# Patient Record
Sex: FEMALE | Race: WHITE | Employment: FULL TIME | URBAN - METROPOLITAN AREA
[De-identification: names, ages, dates, MRNs, and addresses within clinical notes are randomized per-mention and may not be internally consistent; named-entity substitution may affect disease eponyms.]

---

## 2018-01-03 ENCOUNTER — HOSPITAL ENCOUNTER (OUTPATIENT)
Dept: MAMMOGRAPHY | Age: 53
Discharge: HOME | End: 2018-01-03
Attending: NURSE PRACTITIONER | Admitting: NURSE PRACTITIONER

## 2018-01-03 DIAGNOSIS — Z12.31 ENCOUNTER FOR SCREENING MAMMOGRAM FOR BREAST CANCER: ICD-10-CM

## 2018-01-23 ENCOUNTER — OFFICE VISIT (OUTPATIENT)
Dept: ORTHOPEDIC SURGERY | Age: 53
End: 2018-01-23

## 2018-01-23 VITALS — WEIGHT: 189.6 LBS | BODY MASS INDEX: 28.41 KG/M2

## 2018-01-23 DIAGNOSIS — M25.521 RIGHT ELBOW PAIN: Primary | ICD-10-CM

## 2018-01-23 DIAGNOSIS — S53.401A ELBOW SPRAIN, RIGHT, INITIAL ENCOUNTER: ICD-10-CM

## 2018-01-23 PROCEDURE — 99203 OFFICE O/P NEW LOW 30 MIN: CPT | Performed by: ORTHOPAEDIC SURGERY

## 2018-01-23 PROCEDURE — E0191 PROTECTOR HEEL OR ELBOW: HCPCS | Performed by: ORTHOPAEDIC SURGERY

## 2018-01-23 NOTE — PROGRESS NOTES
Chief Complaint  Elbow Pain (slipped on ice 1/14/18 landing on hand and elbow. )      History of Present Illness:  Shruthi Tobin is a 47 y.o. female right-hand-dominant, healthy and active who presents for right elbow pain. This began 9 days ago, she fell on an outstretched right hand and wrist after tripping while running. She had immediate pain in the right elbow posteriorly and laterally followed by swelling and stiffness. Symptoms have not fully resolved, therefore she presents today, her 1st evaluation. No clicking or crepitance. She has discomfort with motion and rotation. She has discomfort with attempting full elbow extension. Medical History  Past Medical History:   Diagnosis Date    Skin cancer     Melanoma, Stomach     Past Surgical History:   Procedure Laterality Date    ANTERIOR CRUCIATE LIGAMENT REPAIR  1993    APPENDECTOMY  1975    FINGER SURGERY  2008    Disclocated finger      Family History   Problem Relation Age of Onset    High Blood Pressure Mother     Diabetes Mother     Diabetes Sister      Social History     Social History    Marital status:      Spouse name: Mando Nugent Number of children: 0    Years of education: N/A     Occupational History    system annalyis  [de-identified]     Social History Main Topics    Smoking status: Never Smoker    Smokeless tobacco: Never Used    Alcohol use Yes      Comment: social    Drug use: No    Sexual activity: Yes     Partners: Male     Other Topics Concern    Not on file     Social History Narrative    No narrative on file     No current outpatient prescriptions on file. No current facility-administered medications for this visit. No Known Allergies    Review of Systems  Relevant review of systems reviewed and available in the patient's chart    Vital Signs  There were no vitals filed for this visit. General Exam:   Constitutional: Patient is adequately groomed with no evidence of malnutrition  Mental Status:  The patient Reason for exam:     Answer:   Elbow Pain       Treatment Plan:  Recommendation made for nonsurgical measures. We will fit and provide a removable elbow brace. We discussed appropriate activity modifications and weight training limitations over the next 5-6 weeks. Follow-up at that point for repeat x-rays of the right elbow. I anticipate good outcome long-term. She will be working on range of motion and stretching at home. Procedures    Elbow Protector B&C (Brace)     Patient was prescribed a Bird [employer] Yuliya Elbow Protector. The right elbow will require protection from this device to improve their function. The orthosis will assist in protecting the affected area, provide functional support and facilitate healing. The patient was educated and fit by a healthcare professional with expert knowledge and specialization in brace application while under the direct supervision of the treating physician. Verbal and written instructions for the use of and application of this item were provided. They were instructed to contact the office immediately should the brace result in increased pain, decreased sensation, increased swelling or worsening of the condition. All questions and concerns were addressed today. Patient is in agreement with the plan.         Lee Aguilar MD  Hand & Upper Extremity Surgery  [address] [facility] partner of [facility] ([facility])

## 2018-02-27 ENCOUNTER — OFFICE VISIT (OUTPATIENT)
Dept: ORTHOPEDIC SURGERY | Age: 53
End: 2018-02-27

## 2018-02-27 VITALS — BODY MASS INDEX: 28.08 KG/M2 | HEIGHT: 69 IN | WEIGHT: 189.6 LBS

## 2018-02-27 DIAGNOSIS — M25.521 ELBOW PAIN, RIGHT: Primary | ICD-10-CM

## 2018-02-27 DIAGNOSIS — S53.401A ELBOW SPRAIN, RIGHT, INITIAL ENCOUNTER: ICD-10-CM

## 2018-02-27 PROCEDURE — 99213 OFFICE O/P EST LOW 20 MIN: CPT | Performed by: ORTHOPAEDIC SURGERY

## 2018-02-27 NOTE — PROGRESS NOTES
Chief Complaint   Patient presents with    Follow-up     Right elbow sprain, possible radial head fracture DOI 1/14/2018       HISTORY OF PRESENT ILLNESS:  Nevin Tsang is a 47 y.o.  patient here for repeat x-ray evaluation after sustaining a Right elbow sprain versus nondisplaced fracture, now 6 weeks ago. Treated nonoperatively. Overall her symptoms are much improved. She has some discomfort in the elbow, minimal stiffness, otherwise she feels greatly improved. ROS:  ROS neg     Past medical history is reviewed again today, no changes to report    PHYSICAL EXAMINATION:  Patient is alert and pleasant, in no acute distress. The affected extremity is examined today. Right elbow reveals resolution of swelling. Minimal discomfort laterally over the radiocapitellar area. She has extension to 0, with full flexion. She has slight hyperextension on the opposite side. Full forearm rotation right side with full motion of the right hand and right wrist.  No varus valgus laxity. No popping or instability the elbow    X-rays:  3 views, right elbow, impression: Excellent alignment of the joint, no apparent foreign body or loose body. Questionable healed radial neck fracture. IMPRESSION AND PLAN: Right elbow sprain versus nondisplaced fracture, much improved  Doing well after sustaining a right elbow injury. X-rays reveal maintained alignment, patient is doing well clinically also. We will continue with our current care plan. Progressive activities as tolerated. Range of motion and stretching. Follow-up in 6 weeks unless needed sooner. If symptoms have resolved, she will follow-up as necessary. If symptoms persist, MRI would be warranted. She is pleased with her progress overall    All questions and concerns were addressed today. Patient is in agreement with the plan.         Ang Pollock MD  Hand & Upper Extremity Surgery  [address]  A partner of [facility]

## 2018-08-08 ENCOUNTER — HOSPITAL ENCOUNTER (OUTPATIENT)
Age: 53
Discharge: HOME | End: 2018-08-08
Payer: COMMERCIAL

## 2018-08-08 ENCOUNTER — OFFICE VISIT (OUTPATIENT)
Dept: INTERNAL MEDICINE CLINIC | Age: 53
End: 2018-08-08

## 2018-08-08 VITALS
SYSTOLIC BLOOD PRESSURE: 122 MMHG | BODY MASS INDEX: 28.58 KG/M2 | DIASTOLIC BLOOD PRESSURE: 68 MMHG | HEIGHT: 69 IN | HEART RATE: 80 BPM | WEIGHT: 193 LBS | OXYGEN SATURATION: 99 %

## 2018-08-08 DIAGNOSIS — D50.9 IRON DEFICIENCY ANEMIA, UNSPECIFIED IRON DEFICIENCY ANEMIA TYPE: ICD-10-CM

## 2018-08-08 DIAGNOSIS — C43.9 MALIGNANT MELANOMA, UNSPECIFIED SITE (HCC): ICD-10-CM

## 2018-08-08 DIAGNOSIS — E78.2 MIXED HYPERLIPIDEMIA: ICD-10-CM

## 2018-08-08 DIAGNOSIS — Z00.00 ROUTINE GENERAL MEDICAL EXAMINATION AT A HEALTH CARE FACILITY: Primary | ICD-10-CM

## 2018-08-08 DIAGNOSIS — Z12.11 SCREENING FOR COLON CANCER: ICD-10-CM

## 2018-08-08 DIAGNOSIS — Z23 NEED FOR PROPHYLACTIC VACCINATION AND INOCULATION AGAINST VARICELLA: ICD-10-CM

## 2018-08-08 DIAGNOSIS — Z00.00 ROUTINE GENERAL MEDICAL EXAMINATION AT A HEALTH CARE FACILITY: ICD-10-CM

## 2018-08-08 LAB
A/G RATIO: 1.6 (ref 1.1–2.2)
ALBUMIN SERPL-MCNC: 4.5 G/DL (ref 3.4–5)
ALP BLD-CCNC: 42 U/L (ref 40–129)
ALT SERPL-CCNC: 11 U/L (ref 10–40)
ANION GAP SERPL CALCULATED.3IONS-SCNC: 11 MMOL/L (ref 3–16)
AST SERPL-CCNC: 14 U/L (ref 15–37)
BASOPHILS ABSOLUTE: 0 K/UL (ref 0–0.2)
BASOPHILS RELATIVE PERCENT: 0.9 %
BILIRUB SERPL-MCNC: 0.4 MG/DL (ref 0–1)
BUN BLDV-MCNC: 17 MG/DL (ref 7–20)
CALCIUM SERPL-MCNC: 9.6 MG/DL (ref 8.3–10.6)
CHLORIDE BLD-SCNC: 104 MMOL/L (ref 99–110)
CHOLESTEROL, TOTAL: 272 MG/DL (ref 0–199)
CO2: 27 MMOL/L (ref 21–32)
CREAT SERPL-MCNC: 0.8 MG/DL (ref 0.6–1.1)
EOSINOPHILS ABSOLUTE: 0.1 K/UL (ref 0–0.6)
EOSINOPHILS RELATIVE PERCENT: 2.2 %
GFR AFRICAN AMERICAN: >60
GFR NON-AFRICAN AMERICAN: >60
GLOBULIN: 2.8 G/DL
GLUCOSE BLD-MCNC: 100 MG/DL (ref 70–99)
HCT VFR BLD CALC: 38.4 % (ref 36–48)
HDLC SERPL-MCNC: 63 MG/DL (ref 40–60)
HEMOGLOBIN: 12.8 G/DL (ref 12–16)
LDL CHOLESTEROL CALCULATED: 197 MG/DL
LYMPHOCYTES ABSOLUTE: 1.7 K/UL (ref 1–5.1)
LYMPHOCYTES RELATIVE PERCENT: 35 %
MCH RBC QN AUTO: 31 PG (ref 26–34)
MCHC RBC AUTO-ENTMCNC: 33.4 G/DL (ref 31–36)
MCV RBC AUTO: 92.9 FL (ref 80–100)
MONOCYTES ABSOLUTE: 0.4 K/UL (ref 0–1.3)
MONOCYTES RELATIVE PERCENT: 8.4 %
NEUTROPHILS ABSOLUTE: 2.6 K/UL (ref 1.7–7.7)
NEUTROPHILS RELATIVE PERCENT: 53.5 %
PDW BLD-RTO: 13.5 % (ref 12.4–15.4)
PLATELET # BLD: 293 K/UL (ref 135–450)
PMV BLD AUTO: 7.7 FL (ref 5–10.5)
POTASSIUM SERPL-SCNC: 4.8 MMOL/L (ref 3.5–5.1)
RBC # BLD: 4.13 M/UL (ref 4–5.2)
SODIUM BLD-SCNC: 142 MMOL/L (ref 136–145)
TOTAL PROTEIN: 7.3 G/DL (ref 6.4–8.2)
TRIGL SERPL-MCNC: 60 MG/DL (ref 0–150)
TSH REFLEX: 1.64 UIU/ML (ref 0.27–4.2)
VLDLC SERPL CALC-MCNC: 12 MG/DL
WBC # BLD: 4.9 K/UL (ref 4–11)

## 2018-08-08 PROCEDURE — 84443 ASSAY THYROID STIM HORMONE: CPT

## 2018-08-08 PROCEDURE — 36415 COLL VENOUS BLD VENIPUNCTURE: CPT

## 2018-08-08 PROCEDURE — 80061 LIPID PANEL: CPT

## 2018-08-08 PROCEDURE — 90471 IMMUNIZATION ADMIN: CPT | Performed by: NURSE PRACTITIONER

## 2018-08-08 PROCEDURE — 85025 COMPLETE CBC W/AUTO DIFF WBC: CPT

## 2018-08-08 PROCEDURE — 90750 HZV VACC RECOMBINANT IM: CPT | Performed by: NURSE PRACTITIONER

## 2018-08-08 PROCEDURE — 99396 PREV VISIT EST AGE 40-64: CPT | Performed by: NURSE PRACTITIONER

## 2018-08-08 PROCEDURE — 80053 COMPREHEN METABOLIC PANEL: CPT

## 2018-08-08 ASSESSMENT — ENCOUNTER SYMPTOMS
DIARRHEA: 0
SINUS PRESSURE: 0
VOMITING: 0
SORE THROAT: 0
FACIAL SWELLING: 0
COUGH: 0
NAUSEA: 0

## 2018-08-08 ASSESSMENT — PATIENT HEALTH QUESTIONNAIRE - PHQ9
1. LITTLE INTEREST OR PLEASURE IN DOING THINGS: 1
SUM OF ALL RESPONSES TO PHQ QUESTIONS 1-9: 2
2. FEELING DOWN, DEPRESSED OR HOPELESS: 1
SUM OF ALL RESPONSES TO PHQ9 QUESTIONS 1 & 2: 2
SUM OF ALL RESPONSES TO PHQ QUESTIONS 1-9: 2

## 2018-08-08 NOTE — PROGRESS NOTES
Neurological: Negative for dizziness, weakness and headaches. Hematological: Negative for adenopathy. Psychiatric/Behavioral: Positive for dysphoric mood (mother passed away 5/18). Negative for sleep disturbance and suicidal ideas. Objective:   Physical Exam   Constitutional: She is oriented to person, place, and time. She appears well-developed and well-nourished. No distress. HENT:   Head: Normocephalic and atraumatic. Right Ear: External ear normal.   Left Ear: External ear normal.   Nose: Nose normal.   Mouth/Throat: Oropharynx is clear and moist.   Eyes: Conjunctivae and EOM are normal. Pupils are equal, round, and reactive to light. Neck: Normal range of motion. Neck supple. Cardiovascular: Normal rate, regular rhythm, normal heart sounds and intact distal pulses. Pulmonary/Chest: Effort normal and breath sounds normal. She has no wheezes. She has no rales. Abdominal: Soft. Bowel sounds are normal. She exhibits no mass. There is no tenderness. There is no rebound and no guarding. Musculoskeletal: She exhibits no edema. Lymphadenopathy:     She has no cervical adenopathy. Neurological: She is alert and oriented to person, place, and time. No cranial nerve deficit. Skin: She is not diaphoretic. Psychiatric: She has a normal mood and affect. Her behavior is normal. Thought content normal.   Tearful today talking about mother's death. Good eye contact. Casually dressed. Assessment:      1. Routine general medical examination at a health care facility  Continue healthy lifestyle choices. Enc exercise regularly and good dietary intake. Recommend annual eye exams; biannual dental exams.    - Comprehensive Metabolic Panel; Future  - Lipid Panel; Future  - CBC Auto Differential; Future  - TSH with Reflex; Future    2. Iron deficiency anemia, unspecified iron deficiency anemia type  Monitor, check CBC    3. Mixed hyperlipidemia  Monitor, check LIPID    4.  Malignant melanoma, unspecified site [facility])  Monitor, f/u DERM annually    5. Screening for colon cancer  Given in office  - POCT FECAL IMMUNOCHEMICAL TEST (FIT); Future    6. Need for prophylactic vaccination and inoculation against varicella  Pt tolerated well. RTO 2 months for 2nd dose  - In- - [facility])          Plan:      See above plan    Return in about 1 year (around 8/8/2019) for Physical, shingrix 2-3 months ([place] reminder call).           Hammad Bear, APRUCHE - CNP

## 2018-08-14 DIAGNOSIS — Z12.11 SCREENING FOR COLON CANCER: ICD-10-CM

## 2018-08-14 LAB
CONTROL: NORMAL
HEMOCCULT STL QL: NEGATIVE

## 2018-08-14 PROCEDURE — 82274 ASSAY TEST FOR BLOOD FECAL: CPT | Performed by: NURSE PRACTITIONER

## 2018-08-24 ENCOUNTER — TELEPHONE (OUTPATIENT)
Dept: INTERNAL MEDICINE CLINIC | Age: 53
End: 2018-08-24

## 2018-08-24 RX ORDER — ATORVASTATIN CALCIUM 10 MG/1
10 TABLET, FILM COATED ORAL DAILY
Qty: 90 TABLET | Refills: 1 | Status: SHIPPED | OUTPATIENT
Start: 2018-08-24 | End: 2018-09-14 | Stop reason: SDUPTHER

## 2018-08-24 NOTE — TELEPHONE ENCOUNTER
Sent rx to pharmacy. Recheck labs in 6 months with OV to make sure she is doing ok on new medication.

## 2018-09-14 RX ORDER — ATORVASTATIN CALCIUM 10 MG/1
10 TABLET, FILM COATED ORAL DAILY
Qty: 90 TABLET | Refills: 0 | Status: SHIPPED | OUTPATIENT
Start: 2018-09-14 | End: 2019-01-15 | Stop reason: SDUPTHER

## 2018-09-14 NOTE — TELEPHONE ENCOUNTER
Refill request for  Atorvastatin  medication.      Name of Kansnoemirinne 44     Last visit - 08/18/18     Pending visit - none    Last refill -08/24/18 at [facility]

## 2018-11-05 RX ORDER — ATORVASTATIN CALCIUM 10 MG/1
10 TABLET, FILM COATED ORAL DAILY
Qty: 90 TABLET | OUTPATIENT
Start: 2018-11-05

## 2018-11-16 ENCOUNTER — NURSE ONLY (OUTPATIENT)
Dept: INTERNAL MEDICINE CLINIC | Age: 53
End: 2018-11-16
Payer: COMMERCIAL

## 2018-11-16 DIAGNOSIS — Z23 NEED FOR ZOSTER VACCINATION: Primary | ICD-10-CM

## 2018-11-16 DIAGNOSIS — Z23 NEED FOR INFLUENZA VACCINATION: ICD-10-CM

## 2018-11-16 PROCEDURE — 90471 IMMUNIZATION ADMIN: CPT | Performed by: NURSE PRACTITIONER

## 2018-11-16 PROCEDURE — 90686 IIV4 VACC NO PRSV 0.5 ML IM: CPT | Performed by: NURSE PRACTITIONER

## 2018-12-10 ENCOUNTER — PATIENT MESSAGE (OUTPATIENT)
Dept: INTERNAL MEDICINE CLINIC | Age: 53
End: 2018-12-10

## 2018-12-10 NOTE — TELEPHONE ENCOUNTER
From: Gina Face  To: Yves SandovalSHWETA - CNP  Sent: 12/10/2018 12:21 PM EST  Subject: Non-Urgent Medical Question    Hello - I'm trying to determine what to do about the \"ringing\" in my left ear that seemed to start this past August. I barely notice it when there is \"white noise\" but at night, trying to sleep it is very noticeable. Please advise.     Thanks,  Kody Lam

## 2018-12-28 ENCOUNTER — OFFICE VISIT (OUTPATIENT)
Dept: INTERNAL MEDICINE CLINIC | Age: 53
End: 2018-12-28
Payer: COMMERCIAL

## 2018-12-28 VITALS
DIASTOLIC BLOOD PRESSURE: 64 MMHG | HEART RATE: 83 BPM | BODY MASS INDEX: 29.6 KG/M2 | SYSTOLIC BLOOD PRESSURE: 124 MMHG | OXYGEN SATURATION: 98 % | WEIGHT: 199 LBS

## 2018-12-28 DIAGNOSIS — H93.12 TINNITUS OF LEFT EAR: Primary | ICD-10-CM

## 2018-12-28 PROCEDURE — 99213 OFFICE O/P EST LOW 20 MIN: CPT | Performed by: NURSE PRACTITIONER

## 2018-12-28 ASSESSMENT — ENCOUNTER SYMPTOMS
FACIAL SWELLING: 0
VOMITING: 0
SINUS PRESSURE: 0
DIARRHEA: 0
NAUSEA: 0
COUGH: 0
SORE THROAT: 0

## 2019-01-05 ENCOUNTER — HOSPITAL ENCOUNTER (OUTPATIENT)
Dept: WOMENS IMAGING | Age: 54
Discharge: HOME | End: 2019-01-05
Payer: COMMERCIAL

## 2019-01-05 DIAGNOSIS — Z12.31 ENCOUNTER FOR SCREENING MAMMOGRAM FOR BREAST CANCER: ICD-10-CM

## 2019-01-05 PROCEDURE — 77067 SCR MAMMO BI INCL CAD: CPT

## 2019-01-15 RX ORDER — ATORVASTATIN CALCIUM 10 MG/1
10 TABLET, FILM COATED ORAL DAILY
Qty: 90 TABLET | Refills: 0 | Status: SHIPPED | OUTPATIENT
Start: 2019-01-15 | End: 2019-05-03 | Stop reason: SDUPTHER

## 2019-05-07 RX ORDER — ATORVASTATIN CALCIUM 10 MG/1
10 TABLET, FILM COATED ORAL DAILY
Qty: 90 TABLET | Refills: 0 | Status: SHIPPED | OUTPATIENT
Start: 2019-05-07 | End: 2019-08-09 | Stop reason: SDUPTHER

## 2019-05-07 NOTE — TELEPHONE ENCOUNTER
Refill request for lipitor medication.      Name of Pharmacy- [facility]    Last visit - 12/28/18     Pending visit - None    Last refill -5/3/19  0 refills

## 2019-08-10 RX ORDER — ATORVASTATIN CALCIUM 10 MG/1
10 TABLET, FILM COATED ORAL DAILY
Qty: 90 TABLET | Refills: 0 | Status: SHIPPED | OUTPATIENT
Start: 2019-08-10 | End: 2020-03-27 | Stop reason: SDUPTHER

## 2019-11-15 ENCOUNTER — NURSE ONLY (OUTPATIENT)
Dept: INTERNAL MEDICINE CLINIC | Age: 54
End: 2019-11-15
Payer: COMMERCIAL

## 2019-11-15 DIAGNOSIS — Z23 NEED FOR IMMUNIZATION AGAINST INFLUENZA: Primary | ICD-10-CM

## 2019-11-15 PROCEDURE — 90471 IMMUNIZATION ADMIN: CPT | Performed by: NURSE PRACTITIONER

## 2019-11-15 PROCEDURE — 90686 IIV4 VACC NO PRSV 0.5 ML IM: CPT | Performed by: NURSE PRACTITIONER

## 2020-01-07 ENCOUNTER — HOSPITAL ENCOUNTER (OUTPATIENT)
Dept: WOMENS IMAGING | Age: 55
Discharge: HOME | End: 2020-01-07
Payer: COMMERCIAL

## 2020-01-07 PROCEDURE — 77067 SCR MAMMO BI INCL CAD: CPT

## 2020-01-15 ENCOUNTER — HOSPITAL ENCOUNTER (OUTPATIENT)
Dept: WOMENS IMAGING | Age: 55
End: 2020-01-15
Payer: COMMERCIAL

## 2020-01-15 ENCOUNTER — HOSPITAL ENCOUNTER (OUTPATIENT)
Dept: WOMENS IMAGING | Age: 55
Discharge: HOME | End: 2020-01-15
Payer: COMMERCIAL

## 2020-01-15 PROCEDURE — 77065 DX MAMMO INCL CAD UNI: CPT

## 2020-04-01 ENCOUNTER — TELEMEDICINE (OUTPATIENT)
Dept: INTERNAL MEDICINE CLINIC | Age: 55
End: 2020-04-01
Payer: COMMERCIAL

## 2020-04-01 PROCEDURE — 99396 PREV VISIT EST AGE 40-64: CPT | Performed by: NURSE PRACTITIONER

## 2020-04-01 ASSESSMENT — PATIENT HEALTH QUESTIONNAIRE - PHQ9
1. LITTLE INTEREST OR PLEASURE IN DOING THINGS: 0
SUM OF ALL RESPONSES TO PHQ9 QUESTIONS 1 & 2: 0
SUM OF ALL RESPONSES TO PHQ QUESTIONS 1-9: 0
2. FEELING DOWN, DEPRESSED OR HOPELESS: 0
SUM OF ALL RESPONSES TO PHQ QUESTIONS 1-9: 0

## 2020-04-01 ASSESSMENT — ENCOUNTER SYMPTOMS
SORE THROAT: 0
SINUS PRESSURE: 0
NAUSEA: 0
COUGH: 0
FACIAL SWELLING: 0
DIARRHEA: 0
VOMITING: 0

## 2020-04-01 NOTE — PROGRESS NOTES
2020    TELEHEALTH EVALUATION -- Audio/Visual (During HWZVA-94 public health emergency)    HPI:    Ernie Guy (:  1965) has requested an audio/video evaluation for the following concern(s): routine physical exam.      She is due for Cologuard, She completed a FIT stool screening one year ago which came back normal. She had a routine PAP in January at [facility]. A recent dental exam, and an eye exam in 2020. She has been exercising by walking outside. She was furlowed today related to COVID-19 but does not show concern about this at this time. Melanoma hx- She follows up with dermatology in . Denies changes to her skin. HLD- lipid screening for hyperlipidemia. She is taking Lipitor with no concerns or issues. Review of Systems   Constitutional: Negative for appetite change, chills, fatigue, fever and unexpected weight change. HENT: Negative for congestion, ear discharge, ear pain, facial swelling, hearing loss, sinus pressure, sneezing and sore throat. Respiratory: Negative for cough. Cardiovascular: Negative for chest pain. Gastrointestinal: Negative for diarrhea, nausea and vomiting. Genitourinary: Negative for difficulty urinating, dysuria, hematuria and urgency. Musculoskeletal: Negative for arthralgias and gait problem. Neurological: Negative for dizziness, weakness and headaches. Hematological: Negative for adenopathy. Psychiatric/Behavioral: Negative for sleep disturbance and suicidal ideas. Prior to Visit Medications    Medication Sig Taking?  Authorizing Provider   atorvastatin (LIPITOR) 10 MG tablet Take 1 tablet by mouth daily Yes Katherine Peter, APRN - CNP       Social History     Tobacco Use    Smoking status: Never Smoker    Smokeless tobacco: Never Used   Substance Use Topics    Alcohol use: Yes     Comment: social    Drug use: No        Past Medical History:   Diagnosis Date    Skin cancer     Melanoma, Stomach   ,   Social

## 2020-04-20 ENCOUNTER — TELEPHONE (OUTPATIENT)
Dept: ORTHOPEDIC SURGERY | Age: 55
End: 2020-04-20

## 2020-04-20 ENCOUNTER — OFFICE VISIT (OUTPATIENT)
Dept: ORTHOPEDIC SURGERY | Age: 55
End: 2020-04-20
Payer: COMMERCIAL

## 2020-04-20 VITALS — HEIGHT: 69 IN | WEIGHT: 199.08 LBS | BODY MASS INDEX: 29.49 KG/M2

## 2020-04-20 PROBLEM — S52.121A CLOSED DISPLACED FRACTURE OF HEAD OF RIGHT RADIUS: Status: ACTIVE | Noted: 2020-04-20

## 2020-04-20 PROCEDURE — 99213 OFFICE O/P EST LOW 20 MIN: CPT | Performed by: ORTHOPAEDIC SURGERY

## 2020-04-20 PROCEDURE — L3760 EO ADJ JT PREFAB CUSTOM FIT: HCPCS | Performed by: ORTHOPAEDIC SURGERY

## 2020-04-20 NOTE — PROGRESS NOTES
present, mild decrease strength throughout the right hand    Special Tests: No instability or crepitance at the wrist with palpation    Skin: There are no additional worrisome rashes, ulcerations or lesions. Gait: normal    Circulation normal    Radiology:     X-rays obtained and reviewed in office:  Views 3  Location right elbow  Impression :  Evidence of previous radial head fracture with slight depression laterally. Assessment: Right elbow pain. Displaced right elbow radial head fracture    Impression:  Encounter Diagnosis   Name Primary?  Right elbow pain Yes       Office Procedures:  Orders Placed This Encounter   Procedures    XR ELBOW RIGHT (MIN 3 VIEWS)     Standing Status:   Future     Number of Occurrences:   1     Standing Expiration Date:   4/17/2021       Treatment Plan: Patient has joint offset as noted on imaging today. We discussed treatment options, both surgically and nonsurgically. Current coronavirus pandemic certainly enters into play. Without surgical realignment of the joint surface, she risks post-injury arthritic change, stiffness and long-term pain of the elbow. She is in agreement with proceeding with surgical realignment and correction of the right radial head by means of open reduction and internal fixation, plan would be to use small cannulated screws or a small plate. Outpatient procedure under anesthesia. Surgical procedure along with time to recovery as outlined. We will proceed with a T scope brace today, this will be more useful postsurgically. Postoperative hand therapy has been ordered also. Procedures    T-Scope Elbow Brace     Patient was prescribed a Breg Elbow T-Scope Brace. The right elbow will require stabilization / immobilization from this semi-rigid / rigid orthosis to improve their function. The orthosis will assist in protecting the affected area, provide functional support and facilitate healing.     The prefabricated orthosis was

## 2020-04-20 NOTE — TELEPHONE ENCOUNTER
04/20/2020  DME . NO AUTHORIZATION REQUIRED. VALID & BILLABLE. MUST BE MEDICALLY NECESSARY. MUST HAVE OFFICE VISIT RECOMMENDING DEVICE WITHIN 6 MONTHS OF DEVICE BEING RECEIVED. DED   $2250 / $470.89 MET  CO-INS  80/20 AFTER DED  OOP  $3575 / $470.89 MET                100% AFTER OOP MET    CONTRACT YEAR PLAN RUNS FROM 7/1/2019 - 6/30/2020. PER ATUL PENNINGTON @ [address] [place] [address] REF [id number].   AP

## 2020-04-23 ENCOUNTER — ANESTHESIA (OUTPATIENT)
Dept: OPERATING ROOM | Age: 55
End: 2020-04-23
Payer: COMMERCIAL

## 2020-04-23 ENCOUNTER — ANESTHESIA EVENT (OUTPATIENT)
Dept: OPERATING ROOM | Age: 55
End: 2020-04-23
Payer: COMMERCIAL

## 2020-04-23 ENCOUNTER — HOSPITAL ENCOUNTER (OUTPATIENT)
Age: 55
Setting detail: OUTPATIENT SURGERY
Discharge: HOME | End: 2020-04-23
Attending: ORTHOPAEDIC SURGERY | Admitting: ORTHOPAEDIC SURGERY
Payer: COMMERCIAL

## 2020-04-23 VITALS
HEART RATE: 71 BPM | OXYGEN SATURATION: 95 % | RESPIRATION RATE: 26 BRPM | SYSTOLIC BLOOD PRESSURE: 104 MMHG | WEIGHT: 200 LBS | TEMPERATURE: 97.9 F | BODY MASS INDEX: 29.62 KG/M2 | HEIGHT: 69 IN | DIASTOLIC BLOOD PRESSURE: 72 MMHG

## 2020-04-23 VITALS
SYSTOLIC BLOOD PRESSURE: 96 MMHG | DIASTOLIC BLOOD PRESSURE: 58 MMHG | RESPIRATION RATE: 12 BRPM | OXYGEN SATURATION: 97 %

## 2020-04-23 PROCEDURE — 7100000001 HC PACU RECOVERY - ADDTL 15 MIN: Performed by: ORTHOPAEDIC SURGERY

## 2020-04-23 PROCEDURE — 3700000000 HC ANESTHESIA ATTENDED CARE: Performed by: ORTHOPAEDIC SURGERY

## 2020-04-23 PROCEDURE — 2709999900 HC NON-CHARGEABLE SUPPLY: Performed by: ORTHOPAEDIC SURGERY

## 2020-04-23 PROCEDURE — 2580000003 HC RX 258: Performed by: ORTHOPAEDIC SURGERY

## 2020-04-23 PROCEDURE — C1713 ANCHOR/SCREW BN/BN,TIS/BN: HCPCS | Performed by: ORTHOPAEDIC SURGERY

## 2020-04-23 PROCEDURE — 2500000003 HC RX 250 WO HCPCS: Performed by: NURSE ANESTHETIST, CERTIFIED REGISTERED

## 2020-04-23 PROCEDURE — 7100000011 HC PHASE II RECOVERY - ADDTL 15 MIN: Performed by: ORTHOPAEDIC SURGERY

## 2020-04-23 PROCEDURE — 2580000003 HC RX 258: Performed by: ANESTHESIOLOGY

## 2020-04-23 PROCEDURE — 6360000002 HC RX W HCPCS: Performed by: ORTHOPAEDIC SURGERY

## 2020-04-23 PROCEDURE — 2720000010 HC SURG SUPPLY STERILE: Performed by: ORTHOPAEDIC SURGERY

## 2020-04-23 PROCEDURE — 3600000015 HC SURGERY LEVEL 5 ADDTL 15MIN: Performed by: ORTHOPAEDIC SURGERY

## 2020-04-23 PROCEDURE — 7100000000 HC PACU RECOVERY - FIRST 15 MIN: Performed by: ORTHOPAEDIC SURGERY

## 2020-04-23 PROCEDURE — 6360000002 HC RX W HCPCS: Performed by: NURSE ANESTHETIST, CERTIFIED REGISTERED

## 2020-04-23 PROCEDURE — 3600000005 HC SURGERY LEVEL 5 BASE: Performed by: ORTHOPAEDIC SURGERY

## 2020-04-23 PROCEDURE — 64415 NJX AA&/STRD BRCH PLXS IMG: CPT | Performed by: ANESTHESIOLOGY

## 2020-04-23 PROCEDURE — 7100000010 HC PHASE II RECOVERY - FIRST 15 MIN: Performed by: ORTHOPAEDIC SURGERY

## 2020-04-23 PROCEDURE — 3700000001 HC ADD 15 MINUTES (ANESTHESIA): Performed by: ORTHOPAEDIC SURGERY

## 2020-04-23 DEVICE — IMPLANTABLE DEVICE: Type: IMPLANTABLE DEVICE | Site: ARM | Status: FUNCTIONAL

## 2020-04-23 DEVICE — SCREW BONE L20MM DIA3.4MM HEX DRVR DIA2MM BLU ST CANN FULL: Type: IMPLANTABLE DEVICE | Site: ARM | Status: FUNCTIONAL

## 2020-04-23 RX ORDER — FENTANYL CITRATE 50 UG/ML
INJECTION, SOLUTION INTRAMUSCULAR; INTRAVENOUS PRN
Status: DISCONTINUED | OUTPATIENT
Start: 2020-04-23 | End: 2020-04-23 | Stop reason: SDUPTHER

## 2020-04-23 RX ORDER — MIDAZOLAM HYDROCHLORIDE 1 MG/ML
INJECTION INTRAMUSCULAR; INTRAVENOUS PRN
Status: DISCONTINUED | OUTPATIENT
Start: 2020-04-23 | End: 2020-04-23 | Stop reason: SDUPTHER

## 2020-04-23 RX ORDER — ONDANSETRON 2 MG/ML
4 INJECTION INTRAMUSCULAR; INTRAVENOUS EVERY 10 MIN PRN
Status: DISCONTINUED | OUTPATIENT
Start: 2020-04-23 | End: 2020-04-23 | Stop reason: HOSPADM

## 2020-04-23 RX ORDER — DEXAMETHASONE SODIUM PHOSPHATE 4 MG/ML
INJECTION, SOLUTION INTRA-ARTICULAR; INTRALESIONAL; INTRAMUSCULAR; INTRAVENOUS; SOFT TISSUE PRN
Status: DISCONTINUED | OUTPATIENT
Start: 2020-04-23 | End: 2020-04-23 | Stop reason: SDUPTHER

## 2020-04-23 RX ORDER — MAGNESIUM HYDROXIDE 1200 MG/15ML
LIQUID ORAL CONTINUOUS PRN
Status: COMPLETED | OUTPATIENT
Start: 2020-04-23 | End: 2020-04-23

## 2020-04-23 RX ORDER — PROPOFOL 10 MG/ML
INJECTION, EMULSION INTRAVENOUS PRN
Status: DISCONTINUED | OUTPATIENT
Start: 2020-04-23 | End: 2020-04-23 | Stop reason: SDUPTHER

## 2020-04-23 RX ORDER — SODIUM CHLORIDE, SODIUM LACTATE, POTASSIUM CHLORIDE, CALCIUM CHLORIDE 600; 310; 30; 20 MG/100ML; MG/100ML; MG/100ML; MG/100ML
INJECTION, SOLUTION INTRAVENOUS CONTINUOUS
Status: DISCONTINUED | OUTPATIENT
Start: 2020-04-23 | End: 2020-04-23 | Stop reason: HOSPADM

## 2020-04-23 RX ORDER — ONDANSETRON 2 MG/ML
INJECTION INTRAMUSCULAR; INTRAVENOUS PRN
Status: DISCONTINUED | OUTPATIENT
Start: 2020-04-23 | End: 2020-04-23 | Stop reason: SDUPTHER

## 2020-04-23 RX ORDER — EPHEDRINE SULFATE 50 MG/ML
INJECTION INTRAVENOUS PRN
Status: DISCONTINUED | OUTPATIENT
Start: 2020-04-23 | End: 2020-04-23 | Stop reason: SDUPTHER

## 2020-04-23 RX ORDER — OXYCODONE HYDROCHLORIDE AND ACETAMINOPHEN 5; 325 MG/1; MG/1
1 TABLET ORAL EVERY 6 HOURS PRN
Qty: 20 TABLET | Refills: 0 | Status: SHIPPED | OUTPATIENT
Start: 2020-04-23 | End: 2020-04-28

## 2020-04-23 RX ORDER — LIDOCAINE HYDROCHLORIDE 10 MG/ML
INJECTION, SOLUTION INFILTRATION; PERINEURAL PRN
Status: DISCONTINUED | OUTPATIENT
Start: 2020-04-23 | End: 2020-04-23 | Stop reason: SDUPTHER

## 2020-04-23 RX ORDER — HYDRALAZINE HYDROCHLORIDE 20 MG/ML
5 INJECTION INTRAMUSCULAR; INTRAVENOUS EVERY 10 MIN PRN
Status: DISCONTINUED | OUTPATIENT
Start: 2020-04-23 | End: 2020-04-23 | Stop reason: HOSPADM

## 2020-04-23 RX ORDER — LABETALOL HYDROCHLORIDE 5 MG/ML
5 INJECTION, SOLUTION INTRAVENOUS EVERY 10 MIN PRN
Status: DISCONTINUED | OUTPATIENT
Start: 2020-04-23 | End: 2020-04-23 | Stop reason: HOSPADM

## 2020-04-23 RX ORDER — LIDOCAINE HYDROCHLORIDE 10 MG/ML
2 INJECTION, SOLUTION INFILTRATION; PERINEURAL
Status: DISCONTINUED | OUTPATIENT
Start: 2020-04-23 | End: 2020-04-23 | Stop reason: HOSPADM

## 2020-04-23 RX ORDER — OXYCODONE HYDROCHLORIDE AND ACETAMINOPHEN 5; 325 MG/1; MG/1
2 TABLET ORAL PRN
Status: DISCONTINUED | OUTPATIENT
Start: 2020-04-23 | End: 2020-04-23 | Stop reason: HOSPADM

## 2020-04-23 RX ORDER — OXYCODONE HYDROCHLORIDE AND ACETAMINOPHEN 5; 325 MG/1; MG/1
1 TABLET ORAL PRN
Status: DISCONTINUED | OUTPATIENT
Start: 2020-04-23 | End: 2020-04-23 | Stop reason: HOSPADM

## 2020-04-23 RX ADMIN — PROPOFOL 200 MG: 10 INJECTION, EMULSION INTRAVENOUS at 10:35

## 2020-04-23 RX ADMIN — FENTANYL CITRATE 50 MCG: 50 INJECTION INTRAMUSCULAR; INTRAVENOUS at 11:29

## 2020-04-23 RX ADMIN — LIDOCAINE HYDROCHLORIDE 40 MG: 10 INJECTION, SOLUTION INFILTRATION; PERINEURAL at 10:35

## 2020-04-23 RX ADMIN — ONDANSETRON 4 MG: 2 INJECTION INTRAMUSCULAR; INTRAVENOUS at 10:44

## 2020-04-23 RX ADMIN — SODIUM CHLORIDE, POTASSIUM CHLORIDE, SODIUM LACTATE AND CALCIUM CHLORIDE: 600; 310; 30; 20 INJECTION, SOLUTION INTRAVENOUS at 08:21

## 2020-04-23 RX ADMIN — MIDAZOLAM HYDROCHLORIDE 2 MG: 2 INJECTION, SOLUTION INTRAMUSCULAR; INTRAVENOUS at 10:30

## 2020-04-23 RX ADMIN — EPHEDRINE SULFATE 10 MG: 50 INJECTION INTRAVENOUS at 11:00

## 2020-04-23 RX ADMIN — CEFAZOLIN SODIUM 2 G: 10 INJECTION, POWDER, FOR SOLUTION INTRAVENOUS at 10:44

## 2020-04-23 RX ADMIN — DEXAMETHASONE SODIUM PHOSPHATE 4 MG: 4 INJECTION, SOLUTION INTRAMUSCULAR; INTRAVENOUS at 10:44

## 2020-04-23 RX ADMIN — FENTANYL CITRATE 50 MCG: 50 INJECTION INTRAMUSCULAR; INTRAVENOUS at 10:43

## 2020-04-23 ASSESSMENT — PULMONARY FUNCTION TESTS
PIF_VALUE: 11
PIF_VALUE: 10
PIF_VALUE: 11
PIF_VALUE: 3
PIF_VALUE: 11
PIF_VALUE: 1
PIF_VALUE: 11
PIF_VALUE: 2
PIF_VALUE: 11
PIF_VALUE: 1
PIF_VALUE: 12
PIF_VALUE: 11
PIF_VALUE: 13
PIF_VALUE: 11
PIF_VALUE: 11
PIF_VALUE: 3
PIF_VALUE: 11
PIF_VALUE: 11
PIF_VALUE: 10
PIF_VALUE: 2
PIF_VALUE: 11
PIF_VALUE: 11
PIF_VALUE: 2
PIF_VALUE: 3
PIF_VALUE: 1
PIF_VALUE: 11
PIF_VALUE: 1
PIF_VALUE: 11
PIF_VALUE: 1
PIF_VALUE: 11
PIF_VALUE: 4
PIF_VALUE: 5
PIF_VALUE: 11
PIF_VALUE: 3
PIF_VALUE: 11
PIF_VALUE: 3
PIF_VALUE: 11
PIF_VALUE: 1
PIF_VALUE: 11
PIF_VALUE: 11

## 2020-04-23 ASSESSMENT — PAIN - FUNCTIONAL ASSESSMENT: PAIN_FUNCTIONAL_ASSESSMENT: 0-10

## 2020-04-23 NOTE — ANESTHESIA PROCEDURE NOTES
Peripheral Block    Patient location during procedure: pre-op  End time: 4/23/2020 8:44 AM  Staffing  Anesthesiologist: Yrn Morocho MD  Performed: anesthesiologist   Preanesthetic Checklist  Completed: patient identified, site marked, surgical consent, pre-op evaluation, timeout performed, IV checked, risks and benefits discussed, monitors and equipment checked, anesthesia consent given, oxygen available and patient being monitored  Peripheral Block  Patient position: sitting  Prep: ChloraPrep  Patient monitoring: cardiac monitor, continuous pulse ox, frequent blood pressure checks and IV access  Block type: Brachial plexus  Laterality: right  Injection technique: single-shot  Procedures: ultrasound guided  Local infiltration: lidocaine  Infiltration strength: 1 %  Dose: 3 mL  Supraclavicular  Provider prep: mask and sterile gloves  Local infiltration: lidocaine  Needle  Needle gauge: 21 G  Needle length: 10 cm  Needle localization: ultrasound guidance  Assessment  Injection assessment: negative aspiration for heme, no paresthesia on injection and local visualized surrounding nerve on ultrasound  Paresthesia pain: none  Slow fractionated injection: yes  Hemodynamics: stable  Additional Notes  Immediately prior to procedure a \"time out\" was called to verify the correct patient, allergies, laterality, procedure and equipment. Time out performed with RN    Local Anesthetic: 0.5 %  Bupivacaine, Decadron 10 mg   Amount: 30 ml  in 5 ml increments after negative aspiration each time. Versed  2 mg  IV    Anterior scalene and middle scalene muscles, 1st rib and pleura, brachial plexus (upper, middle and lower trunk) and Subclavian artery are identified, the tip of the needle and the spread of the local anesthetic around the brachial plexus are visualized. The Brachial plexus appeared to be anatomically normal and there were no abnormal pathologically findings seen.          Reason for block: post-op pain management and at surgeon's request

## 2020-04-23 NOTE — ANESTHESIA PRE PROCEDURE
Department of Anesthesiology  Preprocedure Note       Name:  Eliza Hale   Age:  53 y.o.  :  1965                                          MRN:  [id number]         Date:  2020      Surgeon: Sonja Sqiures):  Buddy Rendon MD    Procedure: OPEN REDUCTION INTERNAL FIXATION RIGHT RADIAL HEAD FRACTURE (Right Wrist)    Medications prior to admission:   Prior to Admission medications    Medication Sig Start Date End Date Taking?  Authorizing Provider   Multiple Minerals-Vitamins (CALCIUM-MAGNESIUM-ZINC-D3 PO) Take 1 tablet by mouth 2 times daily   Yes Historical Provider, MD   atorvastatin (LIPITOR) 10 MG tablet Take 1 tablet by mouth daily  Patient taking differently: Take 10 mg by mouth nightly  3/27/20  Yes SHWETA Santiago - CNP       Current medications:    Current Facility-Administered Medications   Medication Dose Route Frequency Provider Last Rate Last Dose    ceFAZolin (ANCEF) 2 g in dextrose 5 % 100 mL IVPB  2 g Intravenous On Call to [facility] Kalitho Trevizo MD        lidocaine 1 % injection 2 mL  2 mL Intradermal Once PRN Scarlet Mccabe MD        lactated ringers infusion   Intravenous Continuous Scarlet Mccabe  mL/hr at 20 5983         Allergies:  No Known Allergies    Problem List:    Patient Active Problem List   Diagnosis Code    Iron deficiency anemia D50.9    Malignant melanoma ([facility] [address] C43.9    Mixed hyperlipidemia E78.2    Closed displaced fracture of head of right radius S52.121A       Past Medical History:        Diagnosis Date    Hyperlipidemia     Skin cancer     Melanoma, abdominal       Past Surgical History:        Procedure Laterality Date    ANTERIOR CRUCIATE LIGAMENT [address]      Disclocated finger        Social History:    Social History     Tobacco Use    Smoking status: Never Smoker    Smokeless tobacco: Never Used   Substance Use Topics    Alcohol use: Yes     Comment: social                                Counseling given: Not Answered      Vital Signs (Current):   Vitals:    04/20/20 1154 04/23/20 0808   BP:  123/88   Pulse:  82   Resp:  16   Temp:  98.2 °F (36.8 °C)   TempSrc:  Temporal   SpO2:  94%   Weight: 199 lb (90.3 kg) 200 lb (90.7 kg)   Height: 5' 9\" (1.753 m) 5' 9\" (1.753 m)                                              BP Readings from Last 3 Encounters:   04/23/20 123/88   12/28/18 124/64   08/08/18 122/68       NPO Status: Time of last liquid consumption: 2100                        Time of last solid consumption: 1700                        Date of last liquid consumption: 04/22/20                        Date of last solid food consumption: 04/22/20    BMI:   Wt Readings from Last 3 Encounters:   04/23/20 200 lb (90.7 kg)   04/20/20 199 lb 1.2 oz (90.3 kg)   12/28/18 199 lb (90.3 kg)     Body mass index is 29.53 kg/m². CBC:   Lab Results   Component Value Date    WBC 4.9 08/08/2018    RBC 4.13 08/08/2018    HGB 12.8 08/08/2018    HCT 38.4 08/08/2018    MCV 92.9 08/08/2018    RDW 13.5 08/08/2018     08/08/2018       CMP:   Lab Results   Component Value Date     08/08/2018    K 4.8 08/08/2018     08/08/2018    CO2 27 08/08/2018    BUN 17 08/08/2018    CREATININE 0.8 08/08/2018    GFRAA >60 08/08/2018    AGRATIO 1.6 08/08/2018    LABGLOM >60 08/08/2018    GLUCOSE 100 08/08/2018    PROT 7.3 08/08/2018    CALCIUM 9.6 08/08/2018    BILITOT 0.4 08/08/2018    ALKPHOS 42 08/08/2018    AST 14 08/08/2018    ALT 11 08/08/2018       POC Tests: No results for input(s): POCGLU, POCNA, POCK, POCCL, POCBUN, POCHEMO, POCHCT in the last 72 hours.     Coags: No results found for: PROTIME, INR, APTT    HCG (If Applicable): No results found for: PREGTESTUR, PREGSERUM, HCG, HCGQUANT     ABGs: No results found for: PHART, PO2ART, CFI6DZQ, FHC6KLD, BEART, T7VPEYWR     Type & Screen (If Applicable):  No results found for: LABABO, [address]    Anesthesia Evaluation  Patient summary reviewed and Nursing notes reviewed no history of anesthetic complications:   Airway: Mallampati: II  TM distance: >3 FB   Neck ROM: full  Mouth opening: > = 3 FB Dental: normal exam         Pulmonary:Negative Pulmonary ROS and normal exam                               Cardiovascular:Negative CV ROS                      Neuro/Psych:   Negative Neuro/Psych ROS              GI/Hepatic/Renal: Neg GI/Hepatic/Renal ROS       (-) GERD, liver disease and no renal disease       Endo/Other:    (+) malignancy/cancer (skin). (-) diabetes mellitus               Abdominal:           Vascular: negative vascular ROS. Anesthesia Plan      general and regional     ASA 2     (I discussed with the patient the risks and benefits of PIV, general anesthesia, IV Narcotics, PACU. All questions were answered the patient agrees with the plan. Supraclavicular block for post op pain.)  Induction: intravenous. MIPS: Prophylactic antiemetics administered. Anesthetic plan and risks discussed with patient. Plan discussed with CRNA.                   Sonny Lees MD   4/23/2020

## 2020-04-23 NOTE — BRIEF OP NOTE
Brief Postoperative Note      Patient: Bharath Kim  YOB: 1965  MRN: [id number]    Date of Procedure: 4/23/2020    Pre-Op Diagnosis: RIGHT RADIAL HEAD FRACTURE    Post-Op Diagnosis: Same       Procedure(s):  OPEN REDUCTION INTERNAL FIXATION RIGHT RADIAL HEAD FRACTURE  Repair right elbow lateral collateral ligament  Xray right elbow    Surgeon(s):  Delbert Oliver MD    Assistant:  First Assistant: Hank Orellana    Anesthesia: General + Regional    Estimated Blood Loss (mL): less than 50     Complications: None    Specimens:   * No specimens in log *    Implants:  Implant Name Type Inv.  Item Serial No.  Lot No. LRB No. Used Action   screw 2.5mm      Right 2 Implanted   SCREW VPC [id number] Screw/Plate/Nail/Renan SCREW VPC 3.4X20MM  [employer]  Right 1 Implanted         Drains: * No LDAs found *    Findings: see full op note    Electronically signed by Delbert Oliver MD on 4/23/2020 at 11:20 AM

## 2020-04-23 NOTE — ANESTHESIA POSTPROCEDURE EVALUATION
Department of Anesthesiology  Postprocedure Note    Patient: Rogers Arias  MRN: [id number]  YOB: 1965  Date of evaluation: 4/23/2020  Time:  2:55 PM     Procedure Summary     Date:  04/23/20 Room / Location:  [facility]    Anesthesia Start:  1025 Anesthesia Stop:  1143    Procedure:  OPEN REDUCTION INTERNAL FIXATION RIGHT RADIAL HEAD FRACTURE (Right Arm Upper) Diagnosis:       Closed nondisplaced fracture of head of right radius, initial encounter      (RIGHT RADIAL HEAD FRACTURE)    Surgeon:  Tom Crisostomo MD Responsible Provider:  Bernadine Stanley MD    Anesthesia Type:  general, regional ASA Status:  2          Anesthesia Type: general, regional    Caty Phase I: Caty Score: 10    Caty Phase II:      Last vitals: Reviewed and per EMR flowsheets.        Anesthesia Post Evaluation    Patient location during evaluation: PACU  Level of consciousness: awake  Airway patency: patent  Nausea & Vomiting: no nausea  Complications: no  Cardiovascular status: blood pressure returned to baseline  Respiratory status: acceptable  Hydration status: euvolemic

## 2020-04-23 NOTE — PROGRESS NOTES
Patient admitted to PACU from OR. VS WNL. Rt arm wrapped and splinted. Denies pain  arm numb. Able to move fingers on rt slightly.

## 2020-04-27 ENCOUNTER — HOSPITAL ENCOUNTER (OUTPATIENT)
Dept: OCCUPATIONAL THERAPY | Age: 55
Setting detail: THERAPIES SERIES
Discharge: HOME | End: 2020-04-27
Payer: COMMERCIAL

## 2020-04-27 PROCEDURE — 97165 OT EVAL LOW COMPLEX 30 MIN: CPT | Performed by: OCCUPATIONAL THERAPIST

## 2020-04-27 PROCEDURE — 97110 THERAPEUTIC EXERCISES: CPT | Performed by: OCCUPATIONAL THERAPIST

## 2020-04-27 NOTE — FLOWSHEET NOTE
Not Met: []? Adjusted   3) Pt will demonstrate increased ROM to  elbow for improved independence with resuming leisure activites such as biking.  []? Progressing: []? Met: []? Not Met: []? Adjusted   4) Pt will demonstrate increased strength to  70% of left for improved independence with lifting a pan from ths stove and completing household tasks, opening jars. []? Progressing: []? Met: []? Not Met: []? Adjusted   5) Pt will have a decrease in pain to 0/10 to facilitate ability to resume household and leisure tasks more independently. []? Progressing: []? Met: []? Not Met: []? Adjusted    Overall Progression Towards Functional Goals/Treatment Progress Update:  [] Patient is progressing as expected towards functional goals listed. [] Progression is slowed due to complexities/impairments listed. [] Progression has been slowed due to co-morbidities. [x] Plan just implemented, too soon to assess goals progression <30 days  [] Goals require adjustment due to lack of progress  [] Patient is not progressing as expected and requires additional follow up with physician  [] All goals are met  [] Other:     Prognosis for POC: [x] Good [] Fair  [] Poor    Patient requires continued skilled intervention: [x] Yes  [] No    Treatment/Activity Tolerance:  [x] Patient able to complete treatment  [] Patient limited by fatigue  [] Patient limited by pain    [] Patient limited by other medical complications  [] Other:                  PLAN: Frequency/Duration:  1 days per week for 8 weeks (6/18/2020)  [] Continue per plan of care [] Alter current plan (see comments above)  [x] Plan of care initiated [] Hold pending MD visit [] Discharge    Electronically signed by:  Joel BRADFORD/ALBERT, CHT [id number]      Note: If patient does not return for scheduled/ recommended follow up visits, this note will serve as a discharge from care along with most recent update on progress.

## 2020-04-27 NOTE — PLAN OF CARE
occupational performance. Significant task modifications or assistance needed to complete assessment.     Evaluation Code:  [x] Low Complexity EVAL 59679 (typically 30 minutes face to face)  [] Mod Complexity EVAL 34420 (typically 45 minutes face to face)  [] High Complexity EVAL 89185 (typically 60 minutes face to face)    Electronically signed by:  Jayde Crews  OTR/ALBERT, [address] [facility]

## 2020-04-29 ENCOUNTER — OFFICE VISIT (OUTPATIENT)
Dept: ORTHOPEDIC SURGERY | Age: 55
End: 2020-04-29

## 2020-04-29 PROCEDURE — 99024 POSTOP FOLLOW-UP VISIT: CPT | Performed by: ORTHOPAEDIC SURGERY

## 2020-05-08 RX ORDER — ATORVASTATIN CALCIUM 10 MG/1
10 TABLET, FILM COATED ORAL EVERY EVENING
Qty: 90 TABLET | Refills: 3 | Status: SHIPPED | OUTPATIENT
Start: 2020-05-08 | End: 2021-03-19

## 2020-05-11 ENCOUNTER — HOSPITAL ENCOUNTER (OUTPATIENT)
Dept: OCCUPATIONAL THERAPY | Age: 55
Setting detail: THERAPIES SERIES
Discharge: HOME | End: 2020-05-11
Payer: COMMERCIAL

## 2020-05-11 PROCEDURE — 97140 MANUAL THERAPY 1/> REGIONS: CPT | Performed by: OCCUPATIONAL THERAPIST

## 2020-05-11 PROCEDURE — 97110 THERAPEUTIC EXERCISES: CPT | Performed by: OCCUPATIONAL THERAPIST

## 2020-05-11 NOTE — FLOWSHEET NOTE
5/11/2020      Pain Scale: 0/10         SUBJECTIVE: She has been doing her home exercises, is wearing her brace when up and active, and at night. Much less pain    Patient reported deficits/history of current problem: fell while walking, history of suspected radial head fx on right in the past with conservative treatment. 4/23/2020-Open reduction and internal fixation, right radial head fracture with headless screws. OBJECTIVE:   Date:  Hand Dominance:     [x]? Right    []? Left 4/27/2020 5/11/20    Objective Measures:      PAIN 3/10 0/10   Quick DASH 84%    Digits tip to DPFC in cm WNL    Thumb ROM WNL    Wrist ROM Ext/Flex WNL    Forearm ROM  Sup/pron R:60/55  L:85/85 80/80   Elbow ROM Ext/flex R: 50/120  L:+5/150 30/127   Shoulder Flex  Shoulder Abd  Shoulder IR/ER NT    Edema in cm circumf.  elbow R:30.0  L:30.5     strength in lbs R:  L:deferred    Pinch Strengthin lbs: lat  R:  L:    Pinch Strength in lbs:  3 point R:  L:       MMT:  deferred    Observations:  (including splints, bandages, incisions, scars): Did not remove post op absorbant dressing, was clean and intact after taking down post op dressing steristrips came off with her bandaid today, one small area distal incision had bleeding, put a steristrip back on to avoid needing to keep a bandage on it   Sensation [x]? No reported deficits  []? Intact to light touch       []? [place] Shelby test completed, findings as noted:  []? Other:    Palpation NT      MODALITIES: 4/27/20 5/11/20     Fluidotherapy (04388) -     Estim ([id number]/[phone])      Paraffin (06732)      US (83374)      Iontophoresis (03397)      Hot Pack  10    Cold Pack            INTERVENTIONS:      Therapeutic Exercise (71646) HEP- arom aarom elbow, forearm  Gentle elbow extension against gravity, gentle push into extension as needed.        Finisher-no wt ladder, diagonals      pilates ring for scapular control 15x      Instructed in supine bicep stretch (HEP) with self care, reaching, carrying, lifting, house/yardwork, driving/computer work      Manual Treatments:  PROM / STM / Oscillations-Mobs:  G-I, II, III, IV (PA's, Inf., Post.)  [x] (47345) Provided manual therapy to mobilize soft tissue/joints of cervical/CT, scapular GHJ and UE for the purpose of modulating pain, promoting relaxation,  increasing ROM, reducing/eliminating soft tissue swelling/inflammation/restriction, improving soft tissue extensibility and allowing for proper ROM for normal function with self care, reaching, carrying, lifting, house/yardwork, driving/computer work    ADL Training:  [] (28082) Provided self-care/home management training related to activities of daily living and compensatory training, and/or use of adaptive equipment      Charges:  Timed Code Treatment Minutes: 45   Total Treatment Minutes: 55   Worker's Comp: Time In/Time Out     [] EVAL (LOW) 02405 (typically 20 minutes face-to-face)    [] EVAL (MOD) 12945 (typically 30 minutes face-to-face)  [] EVAL (HIGH) 0496 97 06 31 (typically 45 minutes face-to-face)  [] OT Re-eval (63897)       [x] Chai [phone]) x   2   [] DKJRO(53585)  [] NMR (12619) x      [] Estim (attended) (14557)   [x] Manual ([id number]) x  1    [] US (15667)  [] TA () x      [] Paraffin (64686)  [] ADL  (88 [phone] 60) x     [] Splint/L code:    [] Estim (unattended) [phone])  [] Fluidotherapy (85733)  [] DN 1-2 (83557)   [] DN 3+ ([phone])  [] Orthotic Mgmt, Subsequent Enc (26138)  [] Orthotic Mgmt & Training (32767)  [] Other:    ASSESSMENT:      GOALS:  Patient stated goal: to resume leisure activity   []? Progressing: []? Met: []? Not Met: []? Adjusted     Therapist goals for Patient:   Short Term Goals: To be achieved in: 2 weeks  1. Independent in HEP and progression per patient tolerance, in order to prevent re-injury. []? Progressing: []? Met: []? Not Met: []? Adjusted   2.  Patient will have a decrease in pain to facilitate improvement in movement, function, and ADLs as pain    [] Patient limited by other medical complications  [] Other:                  PLAN: Frequency/Duration:  1 days per week for 8 weeks (6/18/2020)  [x] Continue per plan of care [] Alter current plan (see comments above)  [] Plan of care initiated [] Hold pending MD visit [] Discharge    Electronically signed by:  Cheryl SCHUSTER, CHT [id number]      Note: If patient does not return for scheduled/ recommended follow up visits, this note will serve as a discharge from care along with most recent update on progress.

## 2020-05-15 RX ORDER — ATORVASTATIN CALCIUM 10 MG/1
10 TABLET, FILM COATED ORAL EVERY EVENING
Qty: 90 TABLET | Refills: 3 | OUTPATIENT
Start: 2020-05-15

## 2020-05-21 ENCOUNTER — HOSPITAL ENCOUNTER (OUTPATIENT)
Dept: OCCUPATIONAL THERAPY | Age: 55
Setting detail: THERAPIES SERIES
Discharge: HOME | End: 2020-05-21
Payer: COMMERCIAL

## 2020-05-21 PROCEDURE — 97110 THERAPEUTIC EXERCISES: CPT | Performed by: OCCUPATIONAL THERAPIST

## 2020-05-21 PROCEDURE — 97140 MANUAL THERAPY 1/> REGIONS: CPT | Performed by: OCCUPATIONAL THERAPIST

## 2020-06-04 ENCOUNTER — HOSPITAL ENCOUNTER (OUTPATIENT)
Dept: OCCUPATIONAL THERAPY | Age: 55
Setting detail: THERAPIES SERIES
Discharge: HOME | End: 2020-06-04
Payer: COMMERCIAL

## 2020-06-04 PROCEDURE — 97110 THERAPEUTIC EXERCISES: CPT | Performed by: OCCUPATIONAL THERAPIST

## 2020-06-04 PROCEDURE — 97140 MANUAL THERAPY 1/> REGIONS: CPT | Performed by: OCCUPATIONAL THERAPIST

## 2020-06-04 NOTE — FLOWSHEET NOTE
6/4/2020    Pain Scale: 0/10         SUBJECTIVE: Her elbow feels very stiff today \"like a stiff neck\". Has been trying to stay active with walking on a track. Scar is minimal, incision completley closed today. Patient reported deficits/history of current problem: fell while walking, history of suspected radial head fx on right in the past with conservative treatment. 4/23/2020-Open reduction and internal fixation, right radial head fracture with headless screws. OBJECTIVE:   Date:  Hand Dominance:     [x]? Right    []? Left 4/27/2020 5/11/20 5/21/20 6/4/20 6 wks post op    Progress note   Objective Measures:        PAIN 3/10 0/10 0/10    Quick DASH 84%      Digits tip to DPFC in cm WNL      Thumb ROM WNL      Wrist ROM Ext/Flex WNL      Forearm ROM  Sup/pron R:60/55  L:85/85 80/80 WNL    Elbow ROM Ext/flex R: 50/120  L:+5/150 30/127 18/143 08/147   Shoulder Flex  Shoulder Abd  Shoulder IR/ER NT      Edema in cm circumf.  elbow R:30.0  L:30.5       strength in lbs R:  L:deferred      Pinch Strengthin lbs: lat  R:  L:      Pinch Strength in lbs:  3 point R:  L:         MMT:  deferred      Observations:  (including splints, bandages, incisions, scars): Did not remove post op absorbant dressing, was clean and intact after taking down post op dressing steristrips came off with her bandaid today, one small area distal incision had bleeding, put a steristrip back on to avoid needing to keep a bandage on it  Incision closed   Sensation [x]? No reported deficits  []? Intact to light touch       []? [place] Shelby test completed, findings as noted:  []?  Other:      Palpation NT        MODALITIES: 4/27/20 5/11/20 5/21/20 6/4/20   6 wks post    Progress note   Fluidotherapy (92236) -      Estim (08817/30705)       Paraffin (73779)       US (81618)       Iontophoresis (99148)       Hot Pack  10 10 10   Cold Pack              INTERVENTIONS:       Therapeutic Exercise (16655) HEP- arom aarom elbow, forearm Gentle elbow extension against gravity, gentle push into extension as needed. Gentle elbow prom, a clicking sensation noted posterior elbow with end of available extension range Elbow PROM extension and flexion     Finisher-no wt ladder, diagonals Finisher 5# 5# 30x 3 directions     pilates ring for scapular control 15x Power , flex bar red holding close to body, mallet press into yellow putty roll      Instructed in supine bicep stretch (HEP)  Yellow therapy band for wrist, biceps, triceps, shoulder- very light resistance to focus on muscle activation, s/p with 1 wt          Therapeutic Activity (24830)                     Manual Therapy (10469)  Scar and volar elbow mobs, forearm extensor mobs. same Scar and volar elbow mobs   (IASTM, Dry Needling, manual mobilization)              Neuromuscular Reeducation (90017)                     ADL Training (16610)                     HEP Training/Review See sheet(s)                    Splinting       Lcode:       Orthotic Mgmt, Subsequent Enc (76538)       Orthotic Mgmt & Training (83278)              Other: Put her in her hinged brace and demonstrated how to lock, post op dressing was dry and intact Compression sleeve       Therapeutic Exercise & NMR:  [x] (86395) Provided verbal/tactile cueing for activities related to strengthening, flexibility, endurance, ROM  for improvements in scapular, scapulothoracic and UE control with self care, reaching, carrying, lifting, house/yardwork, driving/computer work.    [] (88717) Provided verbal/tactile cueing for activities related to improving balance, coordination, kinesthetic sense, posture, motor skill, proprioception  to assist with  scapular, scapulothoracic and UE control with self care, reaching, carrying, lifting, house/yardwork, driving/computer work.     Therapeutic Activities & NMR:    [] (61775 or 81774) Provided verbal/tactile cueing for activities related to improving balance, coordination, kinesthetic sense,

## 2020-06-17 ENCOUNTER — OFFICE VISIT (OUTPATIENT)
Dept: ORTHOPEDIC SURGERY | Age: 55
End: 2020-06-17

## 2020-06-17 VITALS — HEIGHT: 69 IN | WEIGHT: 199.96 LBS | BODY MASS INDEX: 29.62 KG/M2

## 2020-06-17 PROCEDURE — 99024 POSTOP FOLLOW-UP VISIT: CPT | Performed by: ORTHOPAEDIC SURGERY

## 2020-06-18 ENCOUNTER — HOSPITAL ENCOUNTER (OUTPATIENT)
Dept: OCCUPATIONAL THERAPY | Age: 55
Setting detail: THERAPIES SERIES
Discharge: HOME | End: 2020-06-18
Payer: COMMERCIAL

## 2020-06-18 PROCEDURE — 97110 THERAPEUTIC EXERCISES: CPT | Performed by: OCCUPATIONAL THERAPIST

## 2020-06-18 PROCEDURE — 97140 MANUAL THERAPY 1/> REGIONS: CPT | Performed by: OCCUPATIONAL THERAPIST

## 2020-06-18 NOTE — DISCHARGE SUMMARY
Activities & NMR:    [] (16512 or 28888) Provided verbal/tactile cueing for activities related to improving balance, coordination, kinesthetic sense, posture, motor skill, proprioception and motor activation to allow for proper function of scapular, scapulothoracic and UE control with self care, carrying, lifting, driving/computer work    Home Exercise Program:  See chart 6/18/20   [x] (10225) Reviewed/Progressed HEP activities related to strengthening, flexibility, endurance, ROM of scapular, scapulothoracic and UE control with self care, reaching, carrying, lifting, house/yardwork, driving/computer work  [] (54978) Reviewed/Progressed HEP activities related to improving balance, coordination, kinesthetic sense, posture, motor skill, proprioception of scapular, scapulothoracic and UE control with self care, reaching, carrying, lifting, house/yardwork, driving/computer work      Manual Treatments:  PROM / STM / Oscillations-Mobs:  G-I, II, III, IV (PA's, Inf., Post.)  [x] (11681) Provided manual therapy to mobilize soft tissue/joints of cervical/CT, scapular GHJ and UE for the purpose of modulating pain, promoting relaxation,  increasing ROM, reducing/eliminating soft tissue swelling/inflammation/restriction, improving soft tissue extensibility and allowing for proper ROM for normal function with self care, reaching, carrying, lifting, house/yardwork, driving/computer work    ADL Training:  [] (30387) Provided self-care/home management training related to activities of daily living and compensatory training, and/or use of adaptive equipment      Charges:  Timed Code Treatment Minutes: 30   Total Treatment Minutes: 40   Worker's Comp: Time In/Time Out     [] EVAL (LOW) 64258 (typically 20 minutes face-to-face)    [] EVAL (MOD) 49552 (typically 30 minutes face-to-face)  [] EVAL (HIGH) 0496 97 06 31 (typically 45 minutes face-to-face)  [] OT Re-eval (47004)       [x] Chai ([id number]) x   1   [] KIBGC(62427)  [] NMR (81602) x [] Estim (attended) (27842)   [x] Manual ([id number]) x  1    [] US (38961)  [] TA (00330) x      [] Paraffin (49164)  [] ADL  (61 [phone] 24 60) x     [] Splint/L code:    [] Estim (unattended) (38535)  [] Fluidotherapy (82829)  [] DN 1-2 (13849)   [] DN 3+ (02506)  [] Orthotic Mgmt, Subsequent Enc (87703)  [] Orthotic Mgmt & Training (51508)  [] Other:    ASSESSMENT:      GOALS:  Patient stated goal: to resume leisure activity   []? Progressing: [x]? Met: []? Not Met: []? Adjusted     Therapist goals for Patient:   Short Term Goals: To be achieved in: 2 weeks  1. Independent in HEP and progression per patient tolerance, in order to prevent re-injury. []? Progressing: [x]? Met: []? Not Met: []? Adjusted   2. Patient will have a decrease in pain to facilitate improvement in movement, function, and ADLs as indicated by Functional Deficits. []? Progressing: [x]? Met: []? Not Met: []? Adjusted     Long Term Goals to be achieved in 8 weeks (through 6/18/2020), including patient directed goals to address patient identified performance deficits:  1) Pt to be independent in graded HEP progression with a good level of effort and compliance. []? Progressing: [x]? Met: []? Not Met: []? Adjusted   2) Pt to report a score of </= 35 % on the Quick DASH disability questionnaire for increased performance with carrying, moving, and handling objects. []? Progressing: [x]? Met: []? Not Met: []? Adjusted   3) Pt will demonstrate increased ROM to  elbow for improved independence with resuming leisure activites such as biking.  []? Progressing: [x]? Met: []? Not Met: []? Adjusted   4) Pt will demonstrate increased strength to  70% of left for improved independence with lifting a pan from ths stove and completing household tasks, opening jars. []? Progressing: [x]? Met: []? Not Met: []? Adjusted   5) Pt will have a decrease in pain to 0/10 to facilitate ability to resume household and leisure tasks more independently.   []? Progressing: [x]? Met: []? Not Met: []? Adjusted      Overall Progression Towards Functional Goals/Treatment Progress Update:  [] Patient is progressing as expected towards functional goals listed. [] Progression is slowed due to complexities/impairments listed. [] Progression has been slowed due to co-morbidities. [] Plan just implemented, too soon to assess goals progression <30 days  [] Goals require adjustment due to lack of progress  [] Patient is not progressing as expected and requires additional follow up with physician  [x] All goals are met  [] Other:   Prognosis for POC: [x] Good [] Fair  [] Poor    Patient requires continued skilled intervention: [] Yes  [x] No    Treatment/Activity Tolerance:  [x] Patient able to complete treatment  [] Patient limited by fatigue  [] Patient limited by pain    [] Patient limited by other medical complications  [] Other:                  PLAN: Frequency/Duration:  1 days per week for 8 weeks (6/18/2020)  [] Continue per plan of care [] Alter current plan (see comments above)  [] Plan of care initiated [] Hold pending MD visit [x] Discharge    Electronically signed by:  Gordo BRADFORD/ALBERT, RACHELLE [id number]      Note: If patient does not return for scheduled/ recommended follow up visits, this note will serve as a discharge from care along with most recent update on progress.

## 2020-06-18 NOTE — FLOWSHEET NOTE
[address] [facility], [address] [place] [address] Andrey Oakes  Phone: [phone]  Fax [phone]    Occupational Therapy Treatment Note/ DISCHARGE  Report:     Date:  2020  Patient Name:  Jocelyne Umana    :  1965  MRN: [id number]  Referring Physician: Referring Practitioner: Beau Calderon MD     Insurance information: OT Insurance Information:  [employer] -  [id number]              Medical Diagnosis Information:  Diagnosis: S52.124A (ICD-10-CM) - Closed nondisplaced fracture of head of right radius  reatmetn DX: M25.621-R elbow stiffness   Onset:  2020 slipped while walking landing on right hand/arm  DOS:2020-Open reduction and internal fixation, right radial head fracture  MD appt on 2020       RESTRICTIONS/PRECAUTIONS: no strengthening or lifting until 6 weeks post op (2020)  Latex Allergy:  []? No      []? Yes                    Pacemaker:  []? No       []? Yes   [x]? Patient reported history, allergies, and medications reviewed - see intake form. Preferred Language for Healthcare:   [x]? English       []? other:      Comorbidities Affecting Functional Performance:             []?Anxiety (F41.9)/Depression (F32.9)             []?Diabetes Type 1(E10.65) or 2 (E11.65)       []? Rheumatoid Arthritis (M05.9)  []? Fibromyalgia (M79.7)  []? Neuropathy(G60.9)  []? Osteoarthritis(M19.91)  []? None             [x]? Other:     Functional Scale: 7% (Quick DASH)                                 Date assessed:  20     Visit # Insurance Allowable Auth Required   5  []  Yes []  No    From 20  to 2020    Pain Scale: 0/10         SUBJECTIVE: she saw her MD yesterday and he is pleased with her status. He stated she can do today's therapy visit and then be discharged. She is happy with how she is doing and will gradually increase her weight workouts in the gym.  revieiwed the need to continue with Activities & NMR:    [] (11930 or 55125) Provided verbal/tactile cueing for activities related to improving balance, coordination, kinesthetic sense, posture, motor skill, proprioception and motor activation to allow for proper function of scapular, scapulothoracic and UE control with self care, carrying, lifting, driving/computer work    Home Exercise Program:  See chart 6/18/20   [x] (03583) Reviewed/Progressed HEP activities related to strengthening, flexibility, endurance, ROM of scapular, scapulothoracic and UE control with self care, reaching, carrying, lifting, house/yardwork, driving/computer work  [] (70198) Reviewed/Progressed HEP activities related to improving balance, coordination, kinesthetic sense, posture, motor skill, proprioception of scapular, scapulothoracic and UE control with self care, reaching, carrying, lifting, house/yardwork, driving/computer work      Manual Treatments:  PROM / STM / Oscillations-Mobs:  G-I, II, III, IV (PA's, Inf., Post.)  [x] (60172) Provided manual therapy to mobilize soft tissue/joints of cervical/CT, scapular GHJ and UE for the purpose of modulating pain, promoting relaxation,  increasing ROM, reducing/eliminating soft tissue swelling/inflammation/restriction, improving soft tissue extensibility and allowing for proper ROM for normal function with self care, reaching, carrying, lifting, house/yardwork, driving/computer work    ADL Training:  [] (89225) Provided self-care/home management training related to activities of daily living and compensatory training, and/or use of adaptive equipment      Charges:  Timed Code Treatment Minutes: 30   Total Treatment Minutes: 40   Worker's Comp: Time In/Time Out     [] EVAL (LOW) 66594 (typically 20 minutes face-to-face)    [] EVAL (MOD) 51399 (typically 30 minutes face-to-face)  [] EVAL (HIGH) 0496 97 06 31 (typically 45 minutes face-to-face)  [] OT Re-eval (14232)       [x] Chai ([id number]) x   1   [] QUNPT(13254)  [] NMR (30691) x Progressing: [x]? Met: []? Not Met: []? Adjusted      Overall Progression Towards Functional Goals/Treatment Progress Update:  [] Patient is progressing as expected towards functional goals listed. [] Progression is slowed due to complexities/impairments listed. [] Progression has been slowed due to co-morbidities. [] Plan just implemented, too soon to assess goals progression <30 days  [] Goals require adjustment due to lack of progress  [] Patient is not progressing as expected and requires additional follow up with physician  [x] All goals are met  [] Other:   Prognosis for POC: [x] Good [] Fair  [] Poor    Patient requires continued skilled intervention: [] Yes  [x] No    Treatment/Activity Tolerance:  [x] Patient able to complete treatment  [] Patient limited by fatigue  [] Patient limited by pain    [] Patient limited by other medical complications  [] Other:                  PLAN: Frequency/Duration:  1 days per week for 8 weeks (6/18/2020)  [] Continue per plan of care [] Alter current plan (see comments above)  [] Plan of care initiated [] Hold pending MD visit [x] Discharge    Electronically signed by:  Goyo BRADFORD/L, RACHELLE [id number]      Note: If patient does not return for scheduled/ recommended follow up visits, this note will serve as a discharge from care along with most recent update on progress.

## 2020-10-02 ENCOUNTER — NURSE ONLY (OUTPATIENT)
Dept: INTERNAL MEDICINE CLINIC | Age: 55
End: 2020-10-02
Payer: COMMERCIAL

## 2020-10-02 PROCEDURE — 90686 IIV4 VACC NO PRSV 0.5 ML IM: CPT | Performed by: NURSE PRACTITIONER

## 2020-10-02 PROCEDURE — 90471 IMMUNIZATION ADMIN: CPT | Performed by: NURSE PRACTITIONER

## 2021-01-18 ENCOUNTER — HOSPITAL ENCOUNTER (OUTPATIENT)
Dept: WOMENS IMAGING | Age: 56
Discharge: HOME | End: 2021-01-18
Payer: COMMERCIAL

## 2021-01-18 DIAGNOSIS — Z12.31 SCREENING MAMMOGRAM, ENCOUNTER FOR: ICD-10-CM

## 2021-01-18 PROCEDURE — 77067 SCR MAMMO BI INCL CAD: CPT

## 2021-03-16 ENCOUNTER — IMMUNIZATION (OUTPATIENT)
Dept: PRIMARY CARE CLINIC | Age: 56
End: 2021-03-16
Payer: COMMERCIAL

## 2021-03-16 PROCEDURE — 0011A PR IMM ADMN SARSCOV2 100 MCG/0.5 ML 1ST DOSE: CPT | Performed by: FAMILY MEDICINE

## 2021-03-16 PROCEDURE — 91301 COVID-19, MODERNA VACCINE 100MCG/0.5ML DOSE: CPT | Performed by: FAMILY MEDICINE

## 2021-03-19 RX ORDER — ATORVASTATIN CALCIUM 10 MG/1
TABLET, FILM COATED ORAL
Qty: 90 TABLET | Refills: 3 | Status: SHIPPED | OUTPATIENT
Start: 2021-03-19 | End: 2022-02-16

## 2021-04-02 ENCOUNTER — PATIENT MESSAGE (OUTPATIENT)
Dept: INTERNAL MEDICINE CLINIC | Age: 56
End: 2021-04-02

## 2021-04-02 DIAGNOSIS — Z00.00 ROUTINE GENERAL MEDICAL EXAMINATION AT A HEALTH CARE FACILITY: Primary | ICD-10-CM

## 2021-04-05 NOTE — TELEPHONE ENCOUNTER
From: Lum Sin  To: SHWETA Berman CNP  Sent: 2021 2:07 PM EDT  Subject: Non-Urgent Medical Question    Hello - I'm sorry, I failed to go for my blood work before it  (21) and was wondering if the blood work request could be renewed. My yearly physical is scheduled for  and I think it would be helpful if the blood work is completed before the visit. Please let me know. Thanks!

## 2021-04-13 ENCOUNTER — IMMUNIZATION (OUTPATIENT)
Dept: PRIMARY CARE CLINIC | Age: 56
End: 2021-04-13
Payer: COMMERCIAL

## 2021-04-13 PROCEDURE — 91301 COVID-19, MODERNA VACCINE 100MCG/0.5ML DOSE: CPT | Performed by: FAMILY MEDICINE

## 2021-04-13 PROCEDURE — 0012A COVID-19, MODERNA VACCINE 100MCG/0.5ML DOSE: CPT | Performed by: FAMILY MEDICINE

## 2021-04-16 ENCOUNTER — HOSPITAL ENCOUNTER (OUTPATIENT)
Age: 56
Discharge: HOME | End: 2021-04-16
Payer: COMMERCIAL

## 2021-04-16 DIAGNOSIS — Z00.00 ROUTINE GENERAL MEDICAL EXAMINATION AT A HEALTH CARE FACILITY: ICD-10-CM

## 2021-04-16 LAB
A/G RATIO: 1.3 (ref 1.1–2.2)
ALBUMIN SERPL-MCNC: 4.3 G/DL (ref 3.4–5)
ALP BLD-CCNC: 55 U/L (ref 40–129)
ALT SERPL-CCNC: 31 U/L (ref 10–40)
ANION GAP SERPL CALCULATED.3IONS-SCNC: 10 MMOL/L (ref 3–16)
AST SERPL-CCNC: 26 U/L (ref 15–37)
BILIRUB SERPL-MCNC: 0.4 MG/DL (ref 0–1)
BUN BLDV-MCNC: 13 MG/DL (ref 7–20)
CALCIUM SERPL-MCNC: 9.3 MG/DL (ref 8.3–10.6)
CHLORIDE BLD-SCNC: 102 MMOL/L (ref 99–110)
CHOLESTEROL, TOTAL: 190 MG/DL (ref 0–199)
CO2: 25 MMOL/L (ref 21–32)
CREAT SERPL-MCNC: 1 MG/DL (ref 0.6–1.1)
GFR AFRICAN AMERICAN: >60
GFR NON-AFRICAN AMERICAN: 57
GLOBULIN: 3.2 G/DL
GLUCOSE BLD-MCNC: 155 MG/DL (ref 70–99)
HDLC SERPL-MCNC: 53 MG/DL (ref 40–60)
LDL CHOLESTEROL CALCULATED: 119 MG/DL
POTASSIUM SERPL-SCNC: 4 MMOL/L (ref 3.5–5.1)
SODIUM BLD-SCNC: 137 MMOL/L (ref 136–145)
TOTAL PROTEIN: 7.5 G/DL (ref 6.4–8.2)
TRIGL SERPL-MCNC: 92 MG/DL (ref 0–150)
VLDLC SERPL CALC-MCNC: 18 MG/DL

## 2021-04-16 PROCEDURE — 80061 LIPID PANEL: CPT

## 2021-04-16 PROCEDURE — 80053 COMPREHEN METABOLIC PANEL: CPT

## 2021-04-16 PROCEDURE — 36415 COLL VENOUS BLD VENIPUNCTURE: CPT

## 2021-04-28 ENCOUNTER — OFFICE VISIT (OUTPATIENT)
Dept: INTERNAL MEDICINE CLINIC | Age: 56
End: 2021-04-28
Payer: COMMERCIAL

## 2021-04-28 VITALS
WEIGHT: 198 LBS | DIASTOLIC BLOOD PRESSURE: 62 MMHG | BODY MASS INDEX: 29.33 KG/M2 | OXYGEN SATURATION: 99 % | HEIGHT: 69 IN | SYSTOLIC BLOOD PRESSURE: 128 MMHG | HEART RATE: 88 BPM

## 2021-04-28 DIAGNOSIS — Z23 NEED FOR DIPHTHERIA-TETANUS-PERTUSSIS (TDAP) VACCINE: ICD-10-CM

## 2021-04-28 DIAGNOSIS — C43.9 MALIGNANT MELANOMA, UNSPECIFIED SITE (HCC): ICD-10-CM

## 2021-04-28 DIAGNOSIS — Z12.11 SCREEN FOR COLON CANCER: ICD-10-CM

## 2021-04-28 DIAGNOSIS — E78.2 MIXED HYPERLIPIDEMIA: ICD-10-CM

## 2021-04-28 DIAGNOSIS — Z00.00 ROUTINE GENERAL MEDICAL EXAMINATION AT A HEALTH CARE FACILITY: Primary | ICD-10-CM

## 2021-04-28 PROBLEM — S52.121A CLOSED DISPLACED FRACTURE OF HEAD OF RIGHT RADIUS: Status: RESOLVED | Noted: 2020-04-20 | Resolved: 2021-04-28

## 2021-04-28 PROCEDURE — 90715 TDAP VACCINE 7 YRS/> IM: CPT | Performed by: NURSE PRACTITIONER

## 2021-04-28 PROCEDURE — 90471 IMMUNIZATION ADMIN: CPT | Performed by: NURSE PRACTITIONER

## 2021-04-28 PROCEDURE — 99396 PREV VISIT EST AGE 40-64: CPT | Performed by: NURSE PRACTITIONER

## 2021-04-28 ASSESSMENT — PATIENT HEALTH QUESTIONNAIRE - PHQ9
SUM OF ALL RESPONSES TO PHQ QUESTIONS 1-9: 0
2. FEELING DOWN, DEPRESSED OR HOPELESS: 0
SUM OF ALL RESPONSES TO PHQ QUESTIONS 1-9: 0
1. LITTLE INTEREST OR PLEASURE IN DOING THINGS: 0

## 2021-04-28 ASSESSMENT — ENCOUNTER SYMPTOMS
DIARRHEA: 0
COUGH: 0
SORE THROAT: 0
FACIAL SWELLING: 0
SINUS PRESSURE: 0
VOMITING: 0
NAUSEA: 0

## 2021-04-28 NOTE — PROGRESS NOTES
Katrin El  1965      HPI:  Chief Complaint   Patient presents with    Annual Exam     Furloughed 4/20 to 7/20. She has been working from home since then. She thinks she will be hybrid work in [employer]. Melanoma hx- She follows up yearly. Denies changes to her skin.      HLD- She is taking Lipitor with no concerns or issues. Non fasting glucose, 155. Eye: UTD  Dental: UTD. Continues to travel doing half marathons throughout the year. /62 (Site: Right Upper Arm, Position: Sitting, Cuff Size: Large Adult)   Pulse 88   Ht 5' 8.5\" (1.74 m)   Wt 198 lb (89.8 kg)   SpO2 99%   BMI 29.67 kg/m²     Prior to Visit Medications    Medication Sig Taking?  Authorizing Provider   atorvastatin (LIPITOR) 10 MG tablet TAKE 1 TABLET BY MOUTH IN  THE EVENING Yes José Urrutia, APRN - CNP   Multiple Minerals-Vitamins (CALCIUM-MAGNESIUM-ZINC-D3 PO) Take 1 tablet by mouth 2 times daily Yes Historical Provider, MD     Family History   Problem Relation Age of Onset    High Blood Pressure Mother     Diabetes Mother     Heart Disease Mother     Diabetes Sister     Kidney Cancer Brother      Social History     Socioeconomic History    Marital status:      Spouse name: Ramon Morris Number of children: 0    Years of education: Not on file    Highest education level: Not on file   Occupational History    Occupation: system annalyis      Employer: [employer]   Social Needs    Financial resource strain: Not on file    Food insecurity     Worry: Not on file     Inability: Not on file   [employer] needs     Medical: Not on file     Non-medical: Not on file   Tobacco Use    Smoking status: Never Smoker    Smokeless tobacco: Never Used   Substance and Sexual Activity    Alcohol use: Yes     Comment: social    Drug use: No    Sexual activity: Yes     Partners: Male   Lifestyle    Physical activity     Days per week: Not on file     Minutes per session: Not on file    Stress: Not on file   Relationships    Social connections     Talks on phone: Not on file     Gets together: Not on file     Attends [employer] service: Not on file     Active member of club or organization: Not on file     Attends meetings of clubs or organizations: Not on file     Relationship status: Not on file    Intimate partner violence     Fear of current or ex partner: Not on file     Emotionally abused: Not on file     Physically abused: Not on file     Forced sexual activity: Not on file   Other Topics Concern    Not on file   Social History Narrative    Not on file       Review of Systems   Constitutional: Negative for appetite change, chills, fatigue, fever and unexpected weight change. HENT: Negative for congestion, ear discharge, ear pain, facial swelling, hearing loss, sinus pressure, sneezing and sore throat. Respiratory: Negative for cough. Cardiovascular: Negative for chest pain. Gastrointestinal: Negative for diarrhea, nausea and vomiting. Genitourinary: Negative for difficulty urinating, dysuria, hematuria and urgency. Musculoskeletal: Negative for arthralgias and gait problem. Neurological: Negative for dizziness, weakness and headaches. Hematological: Negative for adenopathy. Psychiatric/Behavioral: Negative for sleep disturbance and suicidal ideas. Physical Exam  Vitals signs reviewed. Constitutional:       Appearance: She is normal weight. HENT:      Head: Normocephalic. Right Ear: Tympanic membrane, ear canal and external ear normal.      Left Ear: Tympanic membrane, ear canal and external ear normal.      Nose: Nose normal.      Mouth/Throat:      Mouth: Mucous membranes are moist.      Pharynx: Oropharynx is clear. Eyes:      Extraocular Movements: Extraocular movements intact. Conjunctiva/sclera: Conjunctivae normal.      Pupils: Pupils are equal, round, and reactive to light. Neck:      Vascular: No carotid bruit.    Cardiovascular:      Rate and Rhythm: Normal rate and regular rhythm. Pulses: Normal pulses. Heart sounds: Normal heart sounds. Pulmonary:      Effort: Pulmonary effort is normal.      Breath sounds: Normal breath sounds. Abdominal:      General: Abdomen is flat. Bowel sounds are normal.      Palpations: There is no mass. Tenderness: There is no abdominal tenderness. Hernia: No hernia is present. Musculoskeletal:      Right lower leg: No edema. Left lower leg: No edema. Neurological:      General: No focal deficit present. Mental Status: She is alert and oriented to person, place, and time. Psychiatric:         Mood and Affect: Mood normal.         Thought Content: Thought content normal.         Assessment:     1. Routine general medical examination at a health care facility  Continue healthy lifestyle choices. Enc exercise regularly and good dietary intake. Recommend annual eye exams; biannual dental exams.    - Lipid Panel; Future  - Comprehensive Metabolic Panel; Future  - Hemoglobin A1C; Future    2. Mixed hyperlipidemia  Maintain atorvastatin. Enc exercise and good food choices. 3. Screen for colon cancer  - Cologuard (For External Results Only); Future    4. Need for diphtheria-tetanus-pertussis (Tdap) vaccine  Pt tolerated well  - Tdap (age 6y and older) IM ([address])    5. Malignant melanoma, unspecified site ([facility] [address]  F/u every year, stable. Plan:    See above plan. Return in about 1 year (around 4/28/2022) for Physical, FBW prior.     Colby Hernandez, APRN - CNP

## 2021-05-17 DIAGNOSIS — Z12.11 SCREEN FOR COLON CANCER: ICD-10-CM

## 2021-10-21 ENCOUNTER — NURSE ONLY (OUTPATIENT)
Dept: INTERNAL MEDICINE CLINIC | Age: 56
End: 2021-10-21
Payer: COMMERCIAL

## 2021-10-21 DIAGNOSIS — Z23 NEED FOR INFLUENZA VACCINATION: Primary | ICD-10-CM

## 2021-10-21 PROCEDURE — 90471 IMMUNIZATION ADMIN: CPT | Performed by: NURSE PRACTITIONER

## 2021-10-21 PROCEDURE — 90688 IIV4 VACCINE SPLT 0.5 ML IM: CPT | Performed by: NURSE PRACTITIONER

## 2021-10-21 PROCEDURE — 99999 PR OFFICE/OUTPT VISIT,PROCEDURE ONLY: CPT | Performed by: NURSE PRACTITIONER

## 2022-01-07 ENCOUNTER — IMMUNIZATION (OUTPATIENT)
Dept: PRIMARY CARE CLINIC | Age: 57
End: 2022-01-07
Payer: COMMERCIAL

## 2022-01-07 PROCEDURE — 91306 COVID-19, MODERNA BOOSTER VACCINE 0.25ML DOSE: CPT | Performed by: FAMILY MEDICINE

## 2022-01-07 PROCEDURE — 0064A COVID-19, MODERNA BOOSTER VACCINE 0.25ML DOSE: CPT | Performed by: FAMILY MEDICINE

## 2022-02-15 ENCOUNTER — HOSPITAL ENCOUNTER (OUTPATIENT)
Dept: WOMENS IMAGING | Age: 57
Discharge: HOME | End: 2022-02-15
Payer: COMMERCIAL

## 2022-02-15 DIAGNOSIS — Z12.31 ENCOUNTER FOR SCREENING MAMMOGRAM FOR MALIGNANT NEOPLASM OF BREAST: ICD-10-CM

## 2022-02-15 PROCEDURE — 77067 SCR MAMMO BI INCL CAD: CPT

## 2022-02-16 RX ORDER — ATORVASTATIN CALCIUM 10 MG/1
TABLET, FILM COATED ORAL
Qty: 90 TABLET | Refills: 3 | Status: SHIPPED | OUTPATIENT
Start: 2022-02-16

## 2022-02-16 NOTE — TELEPHONE ENCOUNTER
Refill request for ATORVASTATIN medication.      Name of Hilary Lane      Last visit - 4/28/21     Pending visit - 5/4/22    Last refill -12/24/21      Medication Contract signed -   Last Oarrs ran-         Additional Comments

## 2022-04-28 ENCOUNTER — HOSPITAL ENCOUNTER (OUTPATIENT)
Age: 57
Discharge: HOME | End: 2022-04-28
Payer: COMMERCIAL

## 2022-04-28 DIAGNOSIS — Z00.00 ROUTINE GENERAL MEDICAL EXAMINATION AT A HEALTH CARE FACILITY: ICD-10-CM

## 2022-04-28 LAB
A/G RATIO: 2.5 (ref 1.1–2.2)
ALBUMIN SERPL-MCNC: 4.8 G/DL (ref 3.4–5)
ALP BLD-CCNC: 44 U/L (ref 40–129)
ALT SERPL-CCNC: 16 U/L (ref 10–40)
ANION GAP SERPL CALCULATED.3IONS-SCNC: 13 MMOL/L (ref 3–16)
AST SERPL-CCNC: 17 U/L (ref 15–37)
BILIRUB SERPL-MCNC: 0.5 MG/DL (ref 0–1)
BUN BLDV-MCNC: 25 MG/DL (ref 7–20)
CALCIUM SERPL-MCNC: 9.7 MG/DL (ref 8.3–10.6)
CHLORIDE BLD-SCNC: 106 MMOL/L (ref 99–110)
CHOLESTEROL, TOTAL: 194 MG/DL (ref 0–199)
CO2: 23 MMOL/L (ref 21–32)
CREAT SERPL-MCNC: 1 MG/DL (ref 0.6–1.1)
GFR AFRICAN AMERICAN: >60
GFR NON-AFRICAN AMERICAN: 57
GLUCOSE BLD-MCNC: 101 MG/DL (ref 70–99)
HDLC SERPL-MCNC: 59 MG/DL (ref 40–60)
LDL CHOLESTEROL CALCULATED: 120 MG/DL
POTASSIUM SERPL-SCNC: 4.7 MMOL/L (ref 3.5–5.1)
SODIUM BLD-SCNC: 142 MMOL/L (ref 136–145)
TOTAL PROTEIN: 6.7 G/DL (ref 6.4–8.2)
TRIGL SERPL-MCNC: 76 MG/DL (ref 0–150)
VLDLC SERPL CALC-MCNC: 15 MG/DL

## 2022-04-28 PROCEDURE — 36415 COLL VENOUS BLD VENIPUNCTURE: CPT

## 2022-04-28 PROCEDURE — 80053 COMPREHEN METABOLIC PANEL: CPT

## 2022-04-28 PROCEDURE — 80061 LIPID PANEL: CPT

## 2022-04-28 PROCEDURE — 83036 HEMOGLOBIN GLYCOSYLATED A1C: CPT

## 2022-04-29 LAB
ESTIMATED AVERAGE GLUCOSE: 122.6 MG/DL
HBA1C MFR BLD: 5.9 %

## 2022-05-04 ENCOUNTER — OFFICE VISIT (OUTPATIENT)
Dept: INTERNAL MEDICINE CLINIC | Age: 57
End: 2022-05-04
Payer: COMMERCIAL

## 2022-05-04 VITALS
TEMPERATURE: 97.4 F | HEART RATE: 86 BPM | OXYGEN SATURATION: 99 % | HEIGHT: 69 IN | BODY MASS INDEX: 28.41 KG/M2 | DIASTOLIC BLOOD PRESSURE: 80 MMHG | WEIGHT: 191.8 LBS | SYSTOLIC BLOOD PRESSURE: 100 MMHG

## 2022-05-04 DIAGNOSIS — Z00.00 ROUTINE GENERAL MEDICAL EXAMINATION AT A HEALTH CARE FACILITY: Primary | ICD-10-CM

## 2022-05-04 DIAGNOSIS — R73.01 IFG (IMPAIRED FASTING GLUCOSE): ICD-10-CM

## 2022-05-04 DIAGNOSIS — C43.9 MALIGNANT MELANOMA, UNSPECIFIED SITE (HCC): ICD-10-CM

## 2022-05-04 DIAGNOSIS — E78.2 MIXED HYPERLIPIDEMIA: ICD-10-CM

## 2022-05-04 PROCEDURE — 99396 PREV VISIT EST AGE 40-64: CPT | Performed by: NURSE PRACTITIONER

## 2022-05-04 ASSESSMENT — ENCOUNTER SYMPTOMS
COUGH: 0
NAUSEA: 0
SINUS PRESSURE: 0
FACIAL SWELLING: 0
VOMITING: 0
DIARRHEA: 0
SORE THROAT: 0

## 2022-05-04 ASSESSMENT — PATIENT HEALTH QUESTIONNAIRE - PHQ9
SUM OF ALL RESPONSES TO PHQ QUESTIONS 1-9: 0
SUM OF ALL RESPONSES TO PHQ9 QUESTIONS 1 & 2: 0
SUM OF ALL RESPONSES TO PHQ QUESTIONS 1-9: 0
1. LITTLE INTEREST OR PLEASURE IN DOING THINGS: 0
SUM OF ALL RESPONSES TO PHQ QUESTIONS 1-9: 0
2. FEELING DOWN, DEPRESSED OR HOPELESS: 0
SUM OF ALL RESPONSES TO PHQ QUESTIONS 1-9: 0

## 2022-05-04 NOTE — PROGRESS NOTES
Sony Florence  1965        Chief Complaint   Patient presents with    Annual Exam    Discuss Labs    Cervical Cancer     awear screening is due        Assessment/Plan:     1. Routine general medical examination at a health care facility  Continue healthy lifestyle choices. Enc exercise regularly and good dietary intake. Recommend annual eye exams; biannual dental exams. Update pap smear    - Lipid Panel; Future  - Comprehensive Metabolic Panel; Future  - CBC with Auto Differential; Future  - Hemoglobin A1C; Future    2. IFG (impaired fasting glucose)  Monitor closely. Reviewed limiting carb intake (pasta and potatoes). 3. Malignant melanoma, unspecified site [facility])  F/u Dermatology yearly    4. Mixed hyperlipidemia  Monitor, maintain Lipitor. Return in about 1 year (around 5/4/2023) for Physical, FBW prior. HPI:      Melanoma hx- She follows up yearly. Denies changes to her skin.      HLD- She is taking Lipitor with no concerns or issues.        Eye: Due, q2 years  Dental: UTD.   GYN: F/u [address] to travel doing half marathons throughout the year. Working from home. /80 (Site: Right Upper Arm, Position: Sitting, Cuff Size: Large Adult)   Pulse 86   Temp 97.4 °F (36.3 °C) (Temporal)   Ht 5' 9\" (1.753 m)   Wt 191 lb 12.8 oz (87 kg)   SpO2 99%   BMI 28.32 kg/m²     Prior to Visit Medications    Medication Sig Taking?  Authorizing Provider   atorvastatin (LIPITOR) 10 MG tablet TAKE 1 TABLET BY MOUTH IN  THE EVENING Yes José Urrutia, APRN - CNP   Multiple Minerals-Vitamins (CALCIUM-MAGNESIUM-ZINC-D3 PO) Take 1 tablet by mouth 2 times daily Yes Historical Provider, MD     Family History   Problem Relation Age of Onset    High Blood Pressure Mother     Diabetes Mother     Heart Disease Mother     Diabetes Sister     Kidney Cancer Brother      Social History     Socioeconomic History    Marital status:      Spouse name: Garcia Mueller Number of children: 0  Years of education: Not on file    Highest education level: Not on file   Occupational History    Occupation: system annalyis      Employer: [employer]   Tobacco Use    Smoking status: Never Smoker    Smokeless tobacco: Never Used   Vaping Use    Vaping Use: Never used   Substance and Sexual Activity    Alcohol use: Yes     Comment: social    Drug use: No    Sexual activity: Yes     Partners: Male   Other Topics Concern    Not on file   Social History Narrative    Not on file     Social Determinants of Health     Financial Resource Strain:     Difficulty of Paying Living Expenses: Not on file   Food Insecurity:     Worried About Running Out of Food in the Last Year: Not on file    Henry of Food in the Last Year: Not on file   Transportation Needs:     Lack of Transportation (Medical): Not on file    Lack of Transportation (Non-Medical): Not on file   Physical Activity:     Days of Exercise per Week: Not on file    Minutes of Exercise per Session: Not on file   Stress:     Feeling of Stress : Not on file   Social Connections:     Frequency of Communication with Friends and Family: Not on file    Frequency of Social Gatherings with Friends and Family: Not on file    Attends Quaker Services: Not on file    Active Member of [address] [place] [address] [employer] or Organizations: Not on file    Attends Club or Organization Meetings: Not on file    Marital Status: Not on file   Intimate Partner Violence:     Fear of Current or Ex-Partner: Not on file    Emotionally Abused: Not on file    Physically Abused: Not on file    Sexually Abused: Not on file   Housing Stability:     Unable to Pay for Housing in the Last Year: Not on file    Number of [place] in the Last Year: Not on file    Unstable Housing in the Last Year: Not on file       Review of Systems   Constitutional: Negative for appetite change, chills, fatigue, fever and unexpected weight change.    HENT: Negative for congestion, ear discharge, ear pain, facial swelling, hearing loss, sinus pressure, sneezing and sore throat. Respiratory: Negative for cough. Cardiovascular: Negative for chest pain. Gastrointestinal: Negative for diarrhea, nausea and vomiting. Genitourinary: Negative for difficulty urinating, dysuria, hematuria and urgency. Musculoskeletal: Negative for arthralgias and gait problem. Neurological: Negative for dizziness, weakness and headaches. Hematological: Negative for adenopathy. Psychiatric/Behavioral: Negative for sleep disturbance and suicidal ideas. Physical Exam  Vitals reviewed. Constitutional:       Appearance: She is normal weight. HENT:      Head: Normocephalic. Right Ear: Tympanic membrane, ear canal and external ear normal.      Left Ear: Tympanic membrane, ear canal and external ear normal.      Nose: Nose normal.      Mouth/Throat:      Mouth: Mucous membranes are moist.      Pharynx: Oropharynx is clear. Eyes:      Extraocular Movements: Extraocular movements intact. Conjunctiva/sclera: Conjunctivae normal.      Pupils: Pupils are equal, round, and reactive to light. Cardiovascular:      Rate and Rhythm: Normal rate and regular rhythm. Pulses: Normal pulses. Heart sounds: Normal heart sounds. Pulmonary:      Effort: Pulmonary effort is normal.      Breath sounds: Normal breath sounds. Abdominal:      General: Abdomen is flat. Bowel sounds are normal.      Palpations: There is no mass. Tenderness: There is no abdominal tenderness. There is no guarding. Musculoskeletal:      Right lower leg: No edema. Left lower leg: No edema. Lymphadenopathy:      Cervical: No cervical adenopathy. Neurological:      General: No focal deficit present. Mental Status: She is alert and oriented to person, place, and time. Psychiatric:         Mood and Affect: Mood normal.         Thought Content:  Thought content normal.         Judgment: Judgment normal.             See above plan.         Umair Nichols, APRN - CNP

## 2023-02-24 RX ORDER — ATORVASTATIN CALCIUM 10 MG/1
TABLET, FILM COATED ORAL
Qty: 90 TABLET | Refills: 3 | Status: SHIPPED | OUTPATIENT
Start: 2023-02-24

## 2023-02-24 NOTE — TELEPHONE ENCOUNTER
Refill request for ATORVASTATIN TABS 10MG medication.      Name of Emily 2 visit - 5-4-2022     Pending visit - 5-    Last refill - 12-      Medication Contract signed -   Last Arturo rivera-         Additional Comments
Statement Selected

## 2023-05-03 ENCOUNTER — HOSPITAL ENCOUNTER (OUTPATIENT)
Age: 58
Discharge: HOME | End: 2023-05-03
Payer: COMMERCIAL

## 2023-05-03 DIAGNOSIS — Z00.00 ROUTINE GENERAL MEDICAL EXAMINATION AT A HEALTH CARE FACILITY: ICD-10-CM

## 2023-05-03 LAB
ALBUMIN SERPL-MCNC: 4.7 G/DL (ref 3.4–5)
ALBUMIN/GLOB SERPL: 2 {RATIO} (ref 1.1–2.2)
ALP SERPL-CCNC: 45 U/L (ref 40–129)
ALT SERPL-CCNC: 17 U/L (ref 10–40)
ANION GAP SERPL CALCULATED.3IONS-SCNC: 12 MMOL/L (ref 3–16)
AST SERPL-CCNC: 18 U/L (ref 15–37)
BASOPHILS # BLD: 0.1 K/UL (ref 0–0.2)
BASOPHILS NFR BLD: 1.3 %
BILIRUB SERPL-MCNC: 0.5 MG/DL (ref 0–1)
BUN SERPL-MCNC: 16 MG/DL (ref 7–20)
CALCIUM SERPL-MCNC: 9.4 MG/DL (ref 8.3–10.6)
CHLORIDE SERPL-SCNC: 104 MMOL/L (ref 99–110)
CHOLEST SERPL-MCNC: 214 MG/DL (ref 0–199)
CO2 SERPL-SCNC: 23 MMOL/L (ref 21–32)
CREAT SERPL-MCNC: 1 MG/DL (ref 0.6–1.1)
DEPRECATED RDW RBC AUTO: 13.1 % (ref 12.4–15.4)
EOSINOPHIL # BLD: 0.1 K/UL (ref 0–0.6)
EOSINOPHIL NFR BLD: 1 %
GFR SERPLBLD CREATININE-BSD FMLA CKD-EPI: >60 ML/MIN/{1.73_M2}
GLUCOSE SERPL-MCNC: 97 MG/DL (ref 70–99)
HCT VFR BLD AUTO: 38.9 % (ref 36–48)
HDLC SERPL-MCNC: 64 MG/DL (ref 40–60)
HGB BLD-MCNC: 12.8 G/DL (ref 12–16)
LDLC SERPL CALC-MCNC: 133 MG/DL
LYMPHOCYTES # BLD: 1.9 K/UL (ref 1–5.1)
LYMPHOCYTES NFR BLD: 34.1 %
MCH RBC QN AUTO: 30.3 PG (ref 26–34)
MCHC RBC AUTO-ENTMCNC: 32.8 G/DL (ref 31–36)
MCV RBC AUTO: 92.4 FL (ref 80–100)
MONOCYTES # BLD: 0.4 K/UL (ref 0–1.3)
MONOCYTES NFR BLD: 7 %
NEUTROPHILS # BLD: 3.2 K/UL (ref 1.7–7.7)
NEUTROPHILS NFR BLD: 56.6 %
PLATELET # BLD AUTO: 292 K/UL (ref 135–450)
PMV BLD AUTO: 7.9 FL (ref 5–10.5)
POTASSIUM SERPL-SCNC: 4.2 MMOL/L (ref 3.5–5.1)
PROT SERPL-MCNC: 7 G/DL (ref 6.4–8.2)
RBC # BLD AUTO: 4.21 M/UL (ref 4–5.2)
SODIUM SERPL-SCNC: 139 MMOL/L (ref 136–145)
TRIGL SERPL-MCNC: 83 MG/DL (ref 0–150)
VLDLC SERPL CALC-MCNC: 17 MG/DL
WBC # BLD AUTO: 5.7 K/UL (ref 4–11)

## 2023-05-03 PROCEDURE — 36415 COLL VENOUS BLD VENIPUNCTURE: CPT

## 2023-05-03 PROCEDURE — 80053 COMPREHEN METABOLIC PANEL: CPT

## 2023-05-03 PROCEDURE — 80061 LIPID PANEL: CPT

## 2023-05-03 PROCEDURE — 83036 HEMOGLOBIN GLYCOSYLATED A1C: CPT

## 2023-05-03 PROCEDURE — 85025 COMPLETE CBC W/AUTO DIFF WBC: CPT

## 2023-05-04 LAB
EST. AVERAGE GLUCOSE BLD GHB EST-MCNC: 119.8 MG/DL
HBA1C MFR BLD: 5.8 %

## 2023-05-10 ENCOUNTER — OFFICE VISIT (OUTPATIENT)
Dept: INTERNAL MEDICINE CLINIC | Age: 58
End: 2023-05-10
Payer: COMMERCIAL

## 2023-05-10 VITALS
WEIGHT: 195.6 LBS | HEART RATE: 73 BPM | HEIGHT: 69 IN | SYSTOLIC BLOOD PRESSURE: 114 MMHG | DIASTOLIC BLOOD PRESSURE: 78 MMHG | OXYGEN SATURATION: 98 % | BODY MASS INDEX: 28.97 KG/M2 | TEMPERATURE: 97.3 F

## 2023-05-10 DIAGNOSIS — R73.01 IFG (IMPAIRED FASTING GLUCOSE): ICD-10-CM

## 2023-05-10 DIAGNOSIS — D50.9 IRON DEFICIENCY ANEMIA, UNSPECIFIED IRON DEFICIENCY ANEMIA TYPE: ICD-10-CM

## 2023-05-10 DIAGNOSIS — Z00.00 ROUTINE GENERAL MEDICAL EXAMINATION AT A HEALTH CARE FACILITY: Primary | ICD-10-CM

## 2023-05-10 DIAGNOSIS — C43.9 MALIGNANT MELANOMA, UNSPECIFIED SITE (HCC): ICD-10-CM

## 2023-05-10 DIAGNOSIS — E78.2 MIXED HYPERLIPIDEMIA: ICD-10-CM

## 2023-05-10 PROCEDURE — 99396 PREV VISIT EST AGE 40-64: CPT | Performed by: NURSE PRACTITIONER

## 2023-05-10 SDOH — ECONOMIC STABILITY: INCOME INSECURITY: HOW HARD IS IT FOR YOU TO PAY FOR THE VERY BASICS LIKE FOOD, HOUSING, MEDICAL CARE, AND HEATING?: NOT VERY HARD

## 2023-05-10 SDOH — ECONOMIC STABILITY: FOOD INSECURITY: WITHIN THE PAST 12 MONTHS, THE FOOD YOU BOUGHT JUST DIDN'T LAST AND YOU DIDN'T HAVE MONEY TO GET MORE.: NEVER TRUE

## 2023-05-10 SDOH — ECONOMIC STABILITY: FOOD INSECURITY: WITHIN THE PAST 12 MONTHS, YOU WORRIED THAT YOUR FOOD WOULD RUN OUT BEFORE YOU GOT MONEY TO BUY MORE.: NEVER TRUE

## 2023-05-10 SDOH — ECONOMIC STABILITY: HOUSING INSECURITY
IN THE LAST 12 MONTHS, WAS THERE A TIME WHEN YOU DID NOT HAVE A STEADY PLACE TO SLEEP OR SLEPT IN A SHELTER (INCLUDING NOW)?: NO

## 2023-05-10 ASSESSMENT — PATIENT HEALTH QUESTIONNAIRE - PHQ9
SUM OF ALL RESPONSES TO PHQ QUESTIONS 1-9: 0
SUM OF ALL RESPONSES TO PHQ9 QUESTIONS 1 & 2: 0
2. FEELING DOWN, DEPRESSED OR HOPELESS: 0
SUM OF ALL RESPONSES TO PHQ QUESTIONS 1-9: 0
1. LITTLE INTEREST OR PLEASURE IN DOING THINGS: 0
SUM OF ALL RESPONSES TO PHQ QUESTIONS 1-9: 0
SUM OF ALL RESPONSES TO PHQ QUESTIONS 1-9: 0

## 2023-05-10 ASSESSMENT — ENCOUNTER SYMPTOMS
COUGH: 0
VOMITING: 0
NAUSEA: 0
DIARRHEA: 0
SINUS PRESSURE: 0
SORE THROAT: 0
FACIAL SWELLING: 0

## 2023-05-10 NOTE — PROGRESS NOTES
Barber Garcia  1965        Chief Complaint   Patient presents with    Annual Exam     Mamm and pap done [facility] feb 2023       Assessment/Plan:     1. Routine general medical examination at a health care facility  Continue healthy lifestyle choices. Enc exercise regularly and good dietary intake. Recommend annual eye exams; biannual dental exams.    - Hemoglobin A1C; Future  - Comprehensive Metabolic Panel; Future  - CBC with Auto Differential; Future  - Lipid Panel; Future    2. Mixed hyperlipidemia  Improve Lipitor compliance. Reviewed labs. 3. IFG (impaired fasting glucose)  Reviewed labs. Stable. 4. Iron deficiency anemia, unspecified iron deficiency anemia type  Monitor    5. Malignant melanoma, unspecified site [facility])  Maintain Derm f/u      Return in about 1 year (around 5/10/2024) for Physical.      HPI:      Melanoma hx- She follows up yearly. Denies changes to her skin. HLD- She is taking Lipitor with no concerns or issues but compliance is low. Eye: UTD, q2 years  Dental: UTD.   GYN: F/u [address]. Continues to travel doing half marathons throughout the year. Working from home. /78 (Site: Left Upper Arm)   Pulse 73   Temp 97.3 °F (36.3 °C)   Ht 5' 9\" (1.753 m)   Wt 195 lb 9.6 oz (88.7 kg)   SpO2 98%   BMI 28.89 kg/m²     Prior to Visit Medications    Medication Sig Taking?  Authorizing Provider   atorvastatin (LIPITOR) 10 MG tablet TAKE 1 TABLET BY MOUTH IN  THE EVENING Yes José Urrutia, APRN - CNP   Multiple Minerals-Vitamins (CALCIUM-MAGNESIUM-ZINC-D3 PO) Take 1 tablet by mouth 2 times daily Yes Historical Provider, MD     Family History   Problem Relation Age of Onset    High Blood Pressure Mother     Diabetes Mother     Heart Disease Mother     Diabetes Sister     Kidney Cancer Brother      Social History     Socioeconomic History    Marital status:      Spouse name: Lucretia Guzman    Number of children: 0    Years of education: Not on file

## 2023-05-10 NOTE — PATIENT INSTRUCTIONS
- Move Lipitor 10mg to morning   - Perform blood test before 1 year visit  - Keep up the great work on staying active.

## 2023-10-12 ENCOUNTER — PATIENT MESSAGE (OUTPATIENT)
Dept: INTERNAL MEDICINE CLINIC | Age: 58
End: 2023-10-12

## 2023-10-12 RX ORDER — ATORVASTATIN CALCIUM 10 MG/1
10 TABLET, FILM COATED ORAL EVERY EVENING
Qty: 90 TABLET | Refills: 3 | Status: SHIPPED | OUTPATIENT
Start: 2023-10-12

## 2023-10-12 NOTE — TELEPHONE ENCOUNTER
Refill request for  atorvastatin  medication.      Name of Pharmacy-  [facility]       Last visit -  5/10/23     Pending visit -  5/15/24    Last refill - 25/24/23      Medication Contract signed -    Last Oarrs ran-          Additional Comments

## 2023-10-12 NOTE — TELEPHONE ENCOUNTER
From: Chayo Thacker  To: Griffin Holt  Sent: 10/12/2023 9:24 AM EDT  Subject: Pharmacy Update    Good morning,    I no longer have [address] and have added  [facility]. I'm in need of a refill for Atorvastatin and wanted to make sure it was sent to the correct pharmacy. Let me know if you need anything else.     Thanks,  Darlin Boucher

## 2024-02-26 RX ORDER — ATORVASTATIN CALCIUM 10 MG/1
10 TABLET, FILM COATED ORAL EVERY EVENING
Qty: 90 TABLET | Refills: 3 | Status: SHIPPED | OUTPATIENT
Start: 2024-02-26

## 2024-02-26 NOTE — TELEPHONE ENCOUNTER
Refill request for  atorvastatin (LIPITOR) 10 MG tablet medication.     Name of Pharmacy- [facility]      Last visit - 5/10/23     Pending visit - 5/15/24    Last refill - 10/12/23      Medication Contract signed - PDMP Monitoring:    Last PDMP Michael as Reviewed:  Review User Review Instant Review Result   NIRU FALL 4/23/2020  9:33 AM Reviewed PDMP [1]     [unfilled]  Urine Drug Screenings (1 yr)    No resulted procedures found.       Medication Contract and Consent for Opioid Use Documents Filed        No documents found                    Last Oarrs ran-         Additional Comments

## 2024-04-29 ENCOUNTER — HOSPITAL ENCOUNTER (OUTPATIENT)
Age: 59
Discharge: HOME | End: 2024-04-29
Payer: COMMERCIAL

## 2024-04-29 DIAGNOSIS — Z00.00 ROUTINE GENERAL MEDICAL EXAMINATION AT A HEALTH CARE FACILITY: ICD-10-CM

## 2024-04-29 LAB
ALBUMIN SERPL-MCNC: 4.5 G/DL (ref 3.4–5)
ALBUMIN/GLOB SERPL: 2 {RATIO} (ref 1.1–2.2)
ALP SERPL-CCNC: 44 U/L (ref 40–129)
ALT SERPL-CCNC: 15 U/L (ref 10–40)
ANION GAP SERPL CALCULATED.3IONS-SCNC: 10 MMOL/L (ref 3–16)
AST SERPL-CCNC: 24 U/L (ref 15–37)
BASOPHILS # BLD: 0 K/UL (ref 0–0.2)
BASOPHILS NFR BLD: 0.8 %
BILIRUB SERPL-MCNC: 0.5 MG/DL (ref 0–1)
BUN SERPL-MCNC: 27 MG/DL (ref 7–20)
CALCIUM SERPL-MCNC: 9.6 MG/DL (ref 8.3–10.6)
CHLORIDE SERPL-SCNC: 108 MMOL/L (ref 99–110)
CHOLEST SERPL-MCNC: 176 MG/DL (ref 0–199)
CO2 SERPL-SCNC: 25 MMOL/L (ref 21–32)
CREAT SERPL-MCNC: 0.9 MG/DL (ref 0.6–1.1)
DEPRECATED RDW RBC AUTO: 13.3 % (ref 12.4–15.4)
EOSINOPHIL # BLD: 0.1 K/UL (ref 0–0.6)
EOSINOPHIL NFR BLD: 2.3 %
EST. AVERAGE GLUCOSE BLD GHB EST-MCNC: 116.9 MG/DL
GFR SERPLBLD CREATININE-BSD FMLA CKD-EPI: 73 ML/MIN/{1.73_M2}
GLUCOSE SERPL-MCNC: 103 MG/DL (ref 70–99)
HBA1C MFR BLD: 5.7 %
HCT VFR BLD AUTO: 38.6 % (ref 36–48)
HDLC SERPL-MCNC: 65 MG/DL (ref 40–60)
HGB BLD-MCNC: 13 G/DL (ref 12–16)
LDLC SERPL CALC-MCNC: 100 MG/DL
LYMPHOCYTES # BLD: 1.9 K/UL (ref 1–5.1)
LYMPHOCYTES NFR BLD: 31.3 %
MCH RBC QN AUTO: 31 PG (ref 26–34)
MCHC RBC AUTO-ENTMCNC: 33.7 G/DL (ref 31–36)
MCV RBC AUTO: 92 FL (ref 80–100)
MONOCYTES # BLD: 0.5 K/UL (ref 0–1.3)
MONOCYTES NFR BLD: 8.2 %
NEUTROPHILS # BLD: 3.5 K/UL (ref 1.7–7.7)
NEUTROPHILS NFR BLD: 57.4 %
PLATELET # BLD AUTO: 307 K/UL (ref 135–450)
PMV BLD AUTO: 7.9 FL (ref 5–10.5)
POTASSIUM SERPL-SCNC: 4.2 MMOL/L (ref 3.5–5.1)
PROT SERPL-MCNC: 6.8 G/DL (ref 6.4–8.2)
RBC # BLD AUTO: 4.2 M/UL (ref 4–5.2)
SODIUM SERPL-SCNC: 143 MMOL/L (ref 136–145)
TRIGL SERPL-MCNC: 57 MG/DL (ref 0–150)
VLDLC SERPL CALC-MCNC: 11 MG/DL
WBC # BLD AUTO: 6 K/UL (ref 4–11)

## 2024-04-29 PROCEDURE — 80061 LIPID PANEL: CPT

## 2024-04-29 PROCEDURE — 83036 HEMOGLOBIN GLYCOSYLATED A1C: CPT

## 2024-04-29 PROCEDURE — 36415 COLL VENOUS BLD VENIPUNCTURE: CPT

## 2024-04-29 PROCEDURE — 85025 COMPLETE CBC W/AUTO DIFF WBC: CPT

## 2024-04-29 PROCEDURE — 80053 COMPREHEN METABOLIC PANEL: CPT

## 2024-05-09 RX ORDER — ATORVASTATIN CALCIUM 10 MG/1
10 TABLET, FILM COATED ORAL EVERY EVENING
Qty: 90 TABLET | Refills: 3 | Status: SHIPPED | OUTPATIENT
Start: 2024-05-09

## 2024-05-09 NOTE — TELEPHONE ENCOUNTER
Refill request for  medication.    ATORVASTATIN 10 MG     Name of Pharmacy- [facility]      Last visit - 5/10/2023     Pending visit - 5/14/2024    Last refill -2/26/2024              Additional Comments

## 2024-05-13 ASSESSMENT — PATIENT HEALTH QUESTIONNAIRE - PHQ9
SUM OF ALL RESPONSES TO PHQ9 QUESTIONS 1 & 2: 0
2. FEELING DOWN, DEPRESSED OR HOPELESS: NOT AT ALL
1. LITTLE INTEREST OR PLEASURE IN DOING THINGS: NOT AT ALL
SUM OF ALL RESPONSES TO PHQ QUESTIONS 1-9: 0
2. FEELING DOWN, DEPRESSED OR HOPELESS: NOT AT ALL
SUM OF ALL RESPONSES TO PHQ QUESTIONS 1-9: 0
SUM OF ALL RESPONSES TO PHQ QUESTIONS 1-9: 0
1. LITTLE INTEREST OR PLEASURE IN DOING THINGS: NOT AT ALL
SUM OF ALL RESPONSES TO PHQ9 QUESTIONS 1 & 2: 0
SUM OF ALL RESPONSES TO PHQ QUESTIONS 1-9: 0

## 2024-05-14 ENCOUNTER — OFFICE VISIT (OUTPATIENT)
Dept: INTERNAL MEDICINE CLINIC | Age: 59
End: 2024-05-14
Payer: COMMERCIAL

## 2024-05-14 VITALS
HEART RATE: 88 BPM | SYSTOLIC BLOOD PRESSURE: 114 MMHG | OXYGEN SATURATION: 97 % | DIASTOLIC BLOOD PRESSURE: 68 MMHG | WEIGHT: 195 LBS | HEIGHT: 69 IN | BODY MASS INDEX: 28.88 KG/M2 | TEMPERATURE: 97.9 F

## 2024-05-14 DIAGNOSIS — Z00.00 ROUTINE GENERAL MEDICAL EXAMINATION AT A HEALTH CARE FACILITY: Primary | ICD-10-CM

## 2024-05-14 DIAGNOSIS — E78.2 MIXED HYPERLIPIDEMIA: ICD-10-CM

## 2024-05-14 DIAGNOSIS — C43.9 MALIGNANT MELANOMA, UNSPECIFIED SITE (HCC): ICD-10-CM

## 2024-05-14 DIAGNOSIS — R73.01 IFG (IMPAIRED FASTING GLUCOSE): ICD-10-CM

## 2024-05-14 DIAGNOSIS — D50.9 IRON DEFICIENCY ANEMIA, UNSPECIFIED IRON DEFICIENCY ANEMIA TYPE: ICD-10-CM

## 2024-05-14 PROCEDURE — 99396 PREV VISIT EST AGE 40-64: CPT | Performed by: NURSE PRACTITIONER

## 2024-05-14 SDOH — ECONOMIC STABILITY: FOOD INSECURITY: WITHIN THE PAST 12 MONTHS, THE FOOD YOU BOUGHT JUST DIDN'T LAST AND YOU DIDN'T HAVE MONEY TO GET MORE.: NEVER TRUE

## 2024-05-14 SDOH — ECONOMIC STABILITY: FOOD INSECURITY: WITHIN THE PAST 12 MONTHS, YOU WORRIED THAT YOUR FOOD WOULD RUN OUT BEFORE YOU GOT MONEY TO BUY MORE.: NEVER TRUE

## 2024-05-14 SDOH — ECONOMIC STABILITY: INCOME INSECURITY: HOW HARD IS IT FOR YOU TO PAY FOR THE VERY BASICS LIKE FOOD, HOUSING, MEDICAL CARE, AND HEATING?: NOT VERY HARD

## 2024-05-14 ASSESSMENT — ENCOUNTER SYMPTOMS
SORE THROAT: 0
DIARRHEA: 0
FACIAL SWELLING: 0
COUGH: 0
VOMITING: 0

## 2024-05-14 NOTE — PROGRESS NOTES
Petty Allene  1965        Chief Complaint   Patient presents with    Annual Exam     Saw [facility] for pap last month, also did a mammo there. Did labs April 29th.        Assessment/Plan:     1. Routine general medical examination at a health care facility  Continue healthy lifestyle choices. Enc exercise regularly and good dietary intake. Recommend annual eye exams; biannual dental exams.    - Lipid Panel; Future  - CBC with Auto Differential; Future  - Comprehensive Metabolic Panel; Future  - Hemoglobin A1C; Future  - Vitamin D 25 Hydroxy; Future  - TSH with Reflex; Future    2. Mixed hyperlipidemia  Maintain Lipitor.     3. IFG (impaired fasting glucose)  Stable.     4. Iron deficiency anemia, unspecified iron deficiency anemia type  Monitor    5. Malignant melanoma, unspecified site (HCC)  F/u Dermatology      Return in about 1 year (around 5/14/2025) for Physical, FBW prior.      HPI:      Melanoma hx- She follows up yearly. Denies changes to her skin.      HLD- She is taking Lipitor with no concerns or issues.     Eye: UTD, q1year  Dental: UTD.   GYN: F/u [facility].   Colonoscopy due 2030.   Mammogram: [facility]     Continues to travel doing half marathons throughout the year. Working from home but last day is Friday and will start looking for job in Fall 2024.          /68   Pulse 88   Temp 97.9 °F (36.6 °C)   Ht 1.753 m (5' 9\")   Wt 88.5 kg (195 lb)   SpO2 97%   BMI 28.80 kg/m²     Prior to Visit Medications    Medication Sig Taking? Authorizing Provider   atorvastatin (LIPITOR) 10 MG tablet Take 1 tablet by mouth every evening Yes José Urrutia APRN - CNP   Multiple Minerals-Vitamins (CALCIUM-MAGNESIUM-ZINC-D3 PO) Take 1 tablet by mouth 2 times daily Yes Provider, MD Ant     Family History   Problem Relation Age of Onset    High Blood Pressure Mother     Diabetes Mother     Heart Disease Mother     Diabetes Sister     Kidney Cancer Sister     Kidney Cancer Brother

## 2025-01-23 RX ORDER — ATORVASTATIN CALCIUM 10 MG/1
10 TABLET, FILM COATED ORAL EVERY EVENING
Qty: 90 TABLET | Refills: 3 | Status: SHIPPED | OUTPATIENT
Start: 2025-01-23

## 2025-01-23 NOTE — TELEPHONE ENCOUNTER
Refill request for atorvastatin (LIPITOR) 10 MG tablet  medication.     Name of Pharmacy- [facility]      Last visit - 051424     Pending visit - [id number]    Last refill -[id number]      Medication Contract signed -  Last Oarrs ran-         Additional Comments

## 2025-03-24 LAB — PAP SMEAR, EXTERNAL: NORMAL

## 2025-05-05 ENCOUNTER — HOSPITAL ENCOUNTER (OUTPATIENT)
Age: 60
Discharge: HOME | End: 2025-05-05
Payer: COMMERCIAL

## 2025-05-05 DIAGNOSIS — Z00.00 ROUTINE GENERAL MEDICAL EXAMINATION AT A HEALTH CARE FACILITY: ICD-10-CM

## 2025-05-05 LAB
25(OH)D3 SERPL-MCNC: 31.1 NG/ML
ALBUMIN SERPL-MCNC: 4.1 G/DL (ref 3.4–5)
ALBUMIN/GLOB SERPL: 1.7 {RATIO} (ref 1.1–2.2)
ALP SERPL-CCNC: 52 U/L (ref 40–129)
ALT SERPL-CCNC: 33 U/L (ref 10–40)
ANION GAP SERPL CALCULATED.3IONS-SCNC: 8 MMOL/L (ref 3–16)
AST SERPL-CCNC: 26 U/L (ref 15–37)
BASOPHILS # BLD: 0 K/UL (ref 0–0.2)
BASOPHILS NFR BLD: 1 %
BILIRUB SERPL-MCNC: 0.4 MG/DL (ref 0–1)
BUN SERPL-MCNC: 21 MG/DL (ref 7–20)
CALCIUM SERPL-MCNC: 9.4 MG/DL (ref 8.3–10.6)
CHLORIDE SERPL-SCNC: 107 MMOL/L (ref 99–110)
CHOLEST SERPL-MCNC: 188 MG/DL (ref 0–199)
CO2 SERPL-SCNC: 26 MMOL/L (ref 21–32)
CREAT SERPL-MCNC: 0.8 MG/DL (ref 0.6–1.2)
DEPRECATED RDW RBC AUTO: 13.5 % (ref 12.4–15.4)
EOSINOPHIL # BLD: 0.2 K/UL (ref 0–0.6)
EOSINOPHIL NFR BLD: 3.1 %
GFR SERPLBLD CREATININE-BSD FMLA CKD-EPI: 84 ML/MIN/{1.73_M2}
GLUCOSE SERPL-MCNC: 106 MG/DL (ref 70–99)
HCT VFR BLD AUTO: 37.2 % (ref 36–48)
HDLC SERPL-MCNC: 61 MG/DL (ref 40–60)
HGB BLD-MCNC: 12.7 G/DL (ref 12–16)
LDLC SERPL CALC-MCNC: 116 MG/DL
LYMPHOCYTES # BLD: 1.8 K/UL (ref 1–5.1)
LYMPHOCYTES NFR BLD: 37.2 %
MCH RBC QN AUTO: 30.9 PG (ref 26–34)
MCHC RBC AUTO-ENTMCNC: 34.3 G/DL (ref 31–36)
MCV RBC AUTO: 90 FL (ref 80–100)
MONOCYTES # BLD: 0.4 K/UL (ref 0–1.3)
MONOCYTES NFR BLD: 9 %
NEUTROPHILS # BLD: 2.4 K/UL (ref 1.7–7.7)
NEUTROPHILS NFR BLD: 49.7 %
PLATELET # BLD AUTO: 292 K/UL (ref 135–450)
PMV BLD AUTO: 7.6 FL (ref 5–10.5)
POTASSIUM SERPL-SCNC: 4.2 MMOL/L (ref 3.5–5.1)
PROT SERPL-MCNC: 6.5 G/DL (ref 6.4–8.2)
RBC # BLD AUTO: 4.13 M/UL (ref 4–5.2)
SODIUM SERPL-SCNC: 141 MMOL/L (ref 136–145)
TRIGL SERPL-MCNC: 54 MG/DL (ref 0–150)
TSH SERPL DL<=0.005 MIU/L-ACNC: 1.69 UIU/ML (ref 0.27–4.2)
VLDLC SERPL CALC-MCNC: 11 MG/DL
WBC # BLD AUTO: 4.8 K/UL (ref 4–11)

## 2025-05-05 PROCEDURE — 36415 COLL VENOUS BLD VENIPUNCTURE: CPT

## 2025-05-05 PROCEDURE — 82306 VITAMIN D 25 HYDROXY: CPT

## 2025-05-05 PROCEDURE — 85025 COMPLETE CBC W/AUTO DIFF WBC: CPT

## 2025-05-05 PROCEDURE — 84443 ASSAY THYROID STIM HORMONE: CPT

## 2025-05-05 PROCEDURE — 83036 HEMOGLOBIN GLYCOSYLATED A1C: CPT

## 2025-05-05 PROCEDURE — 80053 COMPREHEN METABOLIC PANEL: CPT

## 2025-05-05 PROCEDURE — 80061 LIPID PANEL: CPT

## 2025-05-06 ENCOUNTER — RESULTS FOLLOW-UP (OUTPATIENT)
Dept: INTERNAL MEDICINE CLINIC | Age: 60
End: 2025-05-06

## 2025-05-06 LAB
EST. AVERAGE GLUCOSE BLD GHB EST-MCNC: 122.6 MG/DL
HBA1C MFR BLD: 5.9 %

## 2025-05-20 ASSESSMENT — PATIENT HEALTH QUESTIONNAIRE - PHQ9
SUM OF ALL RESPONSES TO PHQ9 QUESTIONS 1 & 2: 0
SUM OF ALL RESPONSES TO PHQ QUESTIONS 1-9: 0
1. LITTLE INTEREST OR PLEASURE IN DOING THINGS: NOT AT ALL
2. FEELING DOWN, DEPRESSED OR HOPELESS: NOT AT ALL
1. LITTLE INTEREST OR PLEASURE IN DOING THINGS: NOT AT ALL
SUM OF ALL RESPONSES TO PHQ QUESTIONS 1-9: 0
SUM OF ALL RESPONSES TO PHQ QUESTIONS 1-9: 0
2. FEELING DOWN, DEPRESSED OR HOPELESS: NOT AT ALL
SUM OF ALL RESPONSES TO PHQ QUESTIONS 1-9: 0

## 2025-05-21 ENCOUNTER — OFFICE VISIT (OUTPATIENT)
Dept: INTERNAL MEDICINE CLINIC | Age: 60
End: 2025-05-21
Payer: COMMERCIAL

## 2025-05-21 VITALS
BODY MASS INDEX: 28.88 KG/M2 | WEIGHT: 195 LBS | SYSTOLIC BLOOD PRESSURE: 139 MMHG | DIASTOLIC BLOOD PRESSURE: 86 MMHG | HEIGHT: 69 IN | HEART RATE: 75 BPM | OXYGEN SATURATION: 97 %

## 2025-05-21 DIAGNOSIS — C43.9 MALIGNANT MELANOMA, UNSPECIFIED SITE (HCC): ICD-10-CM

## 2025-05-21 DIAGNOSIS — Z00.00 ROUTINE GENERAL MEDICAL EXAMINATION AT A HEALTH CARE FACILITY: Primary | ICD-10-CM

## 2025-05-21 DIAGNOSIS — E78.2 MIXED HYPERLIPIDEMIA: ICD-10-CM

## 2025-05-21 DIAGNOSIS — Z23 NEED FOR PNEUMOCOCCAL VACCINATION: ICD-10-CM

## 2025-05-21 DIAGNOSIS — D50.9 IRON DEFICIENCY ANEMIA, UNSPECIFIED IRON DEFICIENCY ANEMIA TYPE: ICD-10-CM

## 2025-05-21 DIAGNOSIS — R73.01 IFG (IMPAIRED FASTING GLUCOSE): ICD-10-CM

## 2025-05-21 PROCEDURE — 99396 PREV VISIT EST AGE 40-64: CPT | Performed by: NURSE PRACTITIONER

## 2025-05-21 PROCEDURE — 90677 PCV20 VACCINE IM: CPT | Performed by: NURSE PRACTITIONER

## 2025-05-21 PROCEDURE — 90471 IMMUNIZATION ADMIN: CPT | Performed by: NURSE PRACTITIONER

## 2025-05-21 PROCEDURE — 90474 IMMUNE ADMIN ORAL/NASAL ADDL: CPT | Performed by: NURSE PRACTITIONER

## 2025-05-21 RX ORDER — ATORVASTATIN CALCIUM 20 MG/1
20 TABLET, FILM COATED ORAL EVERY EVENING
Qty: 90 TABLET | Refills: 3 | Status: SHIPPED | OUTPATIENT
Start: 2025-05-21

## 2025-05-21 SDOH — ECONOMIC STABILITY: FOOD INSECURITY: WITHIN THE PAST 12 MONTHS, YOU WORRIED THAT YOUR FOOD WOULD RUN OUT BEFORE YOU GOT MONEY TO BUY MORE.: NEVER TRUE

## 2025-05-21 SDOH — ECONOMIC STABILITY: FOOD INSECURITY: WITHIN THE PAST 12 MONTHS, THE FOOD YOU BOUGHT JUST DIDN'T LAST AND YOU DIDN'T HAVE MONEY TO GET MORE.: NEVER TRUE

## 2025-05-21 ASSESSMENT — ENCOUNTER SYMPTOMS
SINUS PRESSURE: 0
VOMITING: 0
NAUSEA: 0
FACIAL SWELLING: 0
DIARRHEA: 0
SORE THROAT: 0
COUGH: 0

## 2025-05-21 NOTE — PROGRESS NOTES
Petty Allene  1965        Chief Complaint   Patient presents with    Annual Exam       Assessment/Plan:     1. Routine general medical examination at a health care facility  Continue healthy lifestyle choices. Enc exercise regularly and good dietary intake. Recommend annual eye exams; biannual dental exams.    2. Mixed hyperlipidemia  Increase Lipitor from 10-20mg daily    3. IFG (impaired fasting glucose)  Monitor, stable. A1c 5.9    4. Iron deficiency anemia, unspecified iron deficiency anemia type  Monitor, stable CBC.     5. Malignant melanoma, unspecified site (HCC)  F/u Derm    6. Need for pneumococcal vaccination  Pt tolerated well  - Pneumococcal, PCV20, PREVNAR 20, (age 6w+), IM, PF      Return in about 1 year (around 5/21/2026) for Physical.      HPI:      Melanoma hx- She follows up yearly. Denies changes to her skin.      HLD- She is taking Lipitor with no concerns or issues.  (5/2025)     Eye: UTD, q1year  Dental: UTD.   GYN: F/u [place]. 4/2025, WNL.   Colonoscopy due 2030.   Mammogram: [facility] [place] 4/2025  Dermatology: UTD, annual.      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      Continues to travel doing half marathons throughout the year.           /86 (BP Site: Left Upper Arm, Patient Position: Sitting)   Pulse 75   Ht 1.753 m (5' 9\")   Wt 88.5 kg (195 lb)   SpO2 97%   BMI 28.80 kg/m²     Prior to Visit Medications    Medication Sig Taking? Authorizing Provider   atorvastatin (LIPITOR) 20 MG tablet Take 1 tablet by mouth every evening Yes José Urrutia APRN - CNP   Multiple Minerals-Vitamins (CALCIUM-MAGNESIUM-ZINC-D3 PO) Take 1 tablet by mouth 2 times daily Yes Provider, MD Ant     Family History   Problem Relation Age of Onset    High Blood Pressure Mother     Diabetes Mother     Heart Disease Mother     Diabetes Sister     Kidney Cancer Sister     Kidney Cancer Brother      Social History     Socioeconomic History

## 2025-05-22 RX ORDER — ATORVASTATIN CALCIUM 10 MG/1
10 TABLET, FILM COATED ORAL EVERY EVENING
Qty: 90 TABLET | Refills: 3 | OUTPATIENT
Start: 2025-05-22

## 2025-05-22 NOTE — TELEPHONE ENCOUNTER
Refill request for ATORVASTATIN medication.     Name of Pharmacy- [facility]      Last visit - 5/21/25     Pending visit - 5/27/26    Last refill -5/9/24      Medication Contract signed -   Last Oarrs ran-         Additional Comments

## (undated) DEVICE — Device

## (undated) DEVICE — CHLORAPREP 26ML ORANGE

## (undated) DEVICE — MEDI-VAC NON-CONDUCTIVE SUCTION TUBING: Brand: CARDINAL HEALTH

## (undated) DEVICE — SUTURE ETHLN SZ 4-0 L18IN NONABSORBABLE BLK L19MM PS-2 3/8 1667H

## (undated) DEVICE — K WIRE FXTN L95MM D0.9MM CBLT CHRME MAX VPC SCREW SSTM
Type: IMPLANTABLE DEVICE | Site: ARM | Status: NON-FUNCTIONAL
Removed: 2020-04-23

## (undated) DEVICE — SUTURE VCRL + SZ 2-0 L27IN ABSRB CLR CT-1 1/2 CIR TAPERCUT VCP259H

## (undated) DEVICE — BIT SURG CANN 1.8 MM MAX VPC

## (undated) DEVICE — 10FR FRAZIER SUCTION HANDLE: Brand: CARDINAL HEALTH

## (undated) DEVICE — DRESSING,GAUZE,XEROFORM,CURAD,1"X8",ST: Brand: CURAD

## (undated) DEVICE — CORD ES L12FT BPLR FRCP

## (undated) DEVICE — GLOVE SURG SZ 75 L12IN FNGR THK94MIL STD WHT LTX FREE

## (undated) DEVICE — Z CONVERTED USE 2273164 BANDAGE COMPR W4INXL4 1/2YD E EC SGL LAYERED CLP CLSR ECONO

## (undated) DEVICE — DRAPE C ARM W54XL84IN MINI FOR OEC 6800

## (undated) DEVICE — TOWEL,OR,DSP,ST,BLUE,STD,8/PK,10PK/CS: Brand: MEDLINE

## (undated) DEVICE — SUTURE VCRL + SZ 3-0 L18IN ABSRB UD SH 1/2 CIR TAPERCUT NDL VCP864D

## (undated) DEVICE — STERILE POLYISOPRENE POWDER-FREE SURGICAL GLOVES: Brand: PROTEXIS

## (undated) DEVICE — BIT SURG CANN 2.4 MM MAX VPC

## (undated) DEVICE — ZIMMER® STERILE DISPOSABLE TOURNIQUET CUFF WITH PLC, DUAL PORT, SINGLE BLADDER, 18 IN. (46 CM)

## (undated) DEVICE — SPLINT ORTH W4XL30IN LAYERED FBRGLS FOAM PD BRTH BK MOLD

## (undated) DEVICE — 3M™ STERI-STRIP™ REINFORCED ADHESIVE SKIN CLOSURES, R1547, 1/2 IN X 4 IN (12 MM X 100 MM), 6 STRIPS/ENVELOPE: Brand: 3M™ STERI-STRIP™

## (undated) DEVICE — SUTURE MCRYL + SZ 4-0 L18IN ABSRB UD L19MM PS-2 3/8 CIR MCP496G

## (undated) DEVICE — 3M™ IOBAN™ 2 ANTIMICROBIAL INCISE DRAPE 6650EZ: Brand: IOBAN™ 2

## (undated) DEVICE — K WIRE FIX L105MM DIA1.1MM CO CHROM
Type: IMPLANTABLE DEVICE | Site: ARM | Status: NON-FUNCTIONAL
Removed: 2020-04-23

## (undated) DEVICE — DRESSING FOAM W4XL4IN SIL FACE BORD ADH PD SUP ABSRB COR

## (undated) DEVICE — SUTURE ETHLN SZ 3-0 L30IN NONABSORBABLE BLK FSL L30MM 3/8 1671H